# Patient Record
Sex: MALE | Race: WHITE | NOT HISPANIC OR LATINO | Employment: UNEMPLOYED | URBAN - METROPOLITAN AREA
[De-identification: names, ages, dates, MRNs, and addresses within clinical notes are randomized per-mention and may not be internally consistent; named-entity substitution may affect disease eponyms.]

---

## 2017-03-21 ENCOUNTER — HOSPITAL ENCOUNTER (EMERGENCY)
Facility: HOSPITAL | Age: 52
Discharge: HOME | End: 2017-03-21
Attending: EMERGENCY MEDICINE | Admitting: EMERGENCY MEDICINE
Payer: COMMERCIAL

## 2017-03-21 VITALS
DIASTOLIC BLOOD PRESSURE: 107 MMHG | OXYGEN SATURATION: 95 % | SYSTOLIC BLOOD PRESSURE: 154 MMHG | WEIGHT: 180 LBS | RESPIRATION RATE: 18 BRPM | TEMPERATURE: 97.8 F | HEART RATE: 83 BPM | BODY MASS INDEX: 28.93 KG/M2 | HEIGHT: 66 IN

## 2017-03-21 DIAGNOSIS — F25.0 SCHIZOAFFECTIVE DISORDER, BIPOLAR TYPE (HCC): Primary | Chronic | ICD-10-CM

## 2017-03-21 LAB — ETHANOL EXG-MCNC: 0 MG/DL

## 2017-03-21 PROCEDURE — 82075 ASSAY OF BREATH ETHANOL: CPT | Performed by: EMERGENCY MEDICINE

## 2017-03-21 PROCEDURE — 99284 EMERGENCY DEPT VISIT MOD MDM: CPT

## 2018-03-07 NOTE — PSYCH
History of Present Illness    Presenting Problems: Stressors: PATIENT WAS ADMITTED TO hospitals FOR ACUTE PSYCHOSIS DELUSIONAL THOUGHTS AUDITORY HALLUCINATIONS DUE TO FAMILY CONFLICT  Referral Source: [address] [place] [address]  He is not employed  He is not a smoker  Symptoms: no suicidal ideation, no self abusive behaviors, no homicidal thoughts, no history of violence toward others, depressed mood, anxiety, psychosis, no medication noncompliance, no sleep disturbances, no change in appetite, agitation, no hypomania and PANIC ATTACKS AND UNABLE TO CARE FOR SELF  Provisional Diagnosis: Axis I: SCHIZOAFFECTIVE DS BI POLAR TYPE  Substance Abuse: No substance abuse  Psychiatric Treatment History: hospitals 3 YEARS AGO, Current Psychiatrist: NONE and Therapist: NONE   The patient does not require ambulatory assistance  Legal Issues: The patient does not have legal issues  ACCEPTED  Appointment Date: 04/06/2016        Signatures   Electronically signed by : Amaya Martinez, ; Apr 5 2016  9:11AM EST                       (Author)

## 2022-03-22 ENCOUNTER — HOSPITAL ENCOUNTER (EMERGENCY)
Facility: HOSPITAL | Age: 57
Discharge: HOME | End: 2022-03-22
Attending: INTERNAL MEDICINE | Admitting: INTERNAL MEDICINE
Payer: COMMERCIAL

## 2022-03-22 VITALS
RESPIRATION RATE: 18 BRPM | HEIGHT: 66 IN | OXYGEN SATURATION: 97 % | TEMPERATURE: 98.5 F | HEART RATE: 96 BPM | WEIGHT: 170 LBS | SYSTOLIC BLOOD PRESSURE: 145 MMHG | DIASTOLIC BLOOD PRESSURE: 103 MMHG | BODY MASS INDEX: 27.32 KG/M2

## 2022-03-22 DIAGNOSIS — K04.7 DENTAL INFECTION: ICD-10-CM

## 2022-03-22 DIAGNOSIS — K02.9 DENTAL CARIES: Primary | ICD-10-CM

## 2022-03-22 PROCEDURE — 99284 EMERGENCY DEPT VISIT MOD MDM: CPT | Performed by: INTERNAL MEDICINE

## 2022-03-22 PROCEDURE — 99282 EMERGENCY DEPT VISIT SF MDM: CPT

## 2022-03-22 RX ORDER — IBUPROFEN 600 MG/1
600 TABLET ORAL ONCE
Status: COMPLETED | OUTPATIENT
Start: 2022-03-22 | End: 2022-03-22

## 2022-03-22 RX ORDER — CLINDAMYCIN HYDROCHLORIDE 150 MG/1
150 CAPSULE ORAL EVERY 6 HOURS
Qty: 28 CAPSULE | Refills: 0 | Status: SHIPPED | OUTPATIENT
Start: 2022-03-22 | End: 2022-03-29

## 2022-03-22 RX ORDER — CLINDAMYCIN HYDROCHLORIDE 150 MG/1
300 CAPSULE ORAL ONCE
Status: COMPLETED | OUTPATIENT
Start: 2022-03-22 | End: 2022-03-22

## 2022-03-22 RX ORDER — IBUPROFEN 600 MG/1
600 TABLET ORAL EVERY 6 HOURS PRN
Qty: 40 TABLET | Refills: 0 | Status: SHIPPED | OUTPATIENT
Start: 2022-03-22 | End: 2022-04-01

## 2022-03-22 RX ADMIN — IBUPROFEN 600 MG: 600 TABLET ORAL at 07:19

## 2022-03-22 RX ADMIN — CLINDAMYCIN HYDROCHLORIDE 300 MG: 150 CAPSULE ORAL at 07:19

## 2022-03-22 NOTE — ED PROVIDER NOTES
History  Chief Complaint   Patient presents with    Dental Pain     reports dental infection on left side of mouth x1 month, states swelling is now going up near his eye     This is 51y old came for having L upper gum pain for almost one month  Pt has no fever  Pt observed he has swollen L side of the face  Pt has no sob, HA, dizziness  Pt has no sinus problems  Pt has h/o of schizoaffective disorder and he is on zyprexa  Pt has other complaints  Prior to Admission Medications   Prescriptions Last Dose Informant Patient Reported? Taking? OLANZapine (ZyPREXA) 20 MG tablet 3/21/2022 at Unknown time  Yes Yes   Sig: Take 20 mg by mouth daily at bedtime   ibuprofen (MOTRIN) 600 mg tablet   No No   Sig: Take 1 tablet by mouth every 6 (six) hours as needed for mild pain for up to 10 days  ibuprofen (MOTRIN) 600 mg tablet   No No   Sig: Take 1 tablet (600 mg total) by mouth every 6 (six) hours as needed for mild pain for up to 10 days      Facility-Administered Medications: None       Past Medical History:   Diagnosis Date    Alcohol abuse 9/7/2016    Anxiety     [facility]ipolar disorder ([facility] )     Depression     Psychiatric disorder     Psychiatric illness     Psychotic disorder ([facility] ) 3/29/2016    Schizoaffective disorder ([place] [address] )        Past Surgical History:   Procedure Laterality Date    ORIF WRIST FRACTURE Left 9/8/2016    Procedure: OPEN REDUCTION W/ INTERNAL FIXATION (ORIF) RADIUS / ULNA (WRIST);   Surgeon: Zach Matthews MD;  Location: [facility] OR;  Service:        Family History   Problem Relation Age of Onset    No Known Problems Mother     No Known Problems Father     No Known Problems Sister     No Known Problems Brother     No Known Problems Maternal Aunt     No Known Problems Paternal Aunt     No Known Problems Maternal Uncle     No Known Problems Paternal Uncle     No Known Problems Maternal Grandfather     No Known Problems Maternal Grandmother     No Known Problems Paternal Grandfather  No Known Problems Paternal Grandmother     No Known Problems Cousin     ADD / ADHD Neg Hx     Alcohol abuse Neg Hx     Anxiety disorder Neg Hx     Bipolar disorder Neg Hx     Dementia Neg Hx     Depression Neg Hx     Drug abuse Neg Hx     OCD Neg Hx     Paranoid behavior Neg Hx     Schizophrenia Neg Hx     Seizures Neg Hx     Self-Injury Neg Hx     Suicide Attempts Neg Hx      I have reviewed and agree with the history as documented  E-Cigarette/Vaping     E-Cigarette/Vaping Substances     Social History     Tobacco Use    Smoking status: Former Smoker    Smokeless tobacco: Never Used   Substance Use Topics    Alcohol use: Yes     Alcohol/week: 5 0 standard drinks     Types: 5 Cans of beer per week     Comment: occasionally    Drug use: Yes     Types: Marijuana     Comment: pt states he has [facility]       Review of Systems   Constitutional: Negative for diaphoresis, fatigue and fever  HENT: Positive for facial swelling  Negative for congestion, dental problem, drooling, ear discharge, ear pain, hearing loss, mouth sores, nosebleeds, postnasal drip, rhinorrhea, sinus pressure, sinus pain, sneezing, sore throat, tinnitus and trouble swallowing  Eyes: Negative for photophobia, redness and visual disturbance  Respiratory: Negative for cough, chest tightness, shortness of breath and wheezing  Cardiovascular: Negative for chest pain, palpitations and leg swelling  Gastrointestinal: Negative for abdominal pain, diarrhea, nausea and vomiting  Genitourinary: Negative for difficulty urinating, dysuria, flank pain and hematuria  Musculoskeletal: Negative for arthralgias, back pain, gait problem, neck pain and neck stiffness  Skin: Negative for color change, pallor and rash  Allergic/Immunologic: Negative for immunocompromised state  Neurological: Negative for dizziness, light-headedness and headaches  Hematological: Negative for adenopathy   Does not bruise/bleed easily  Psychiatric/Behavioral: Negative for agitation and behavioral problems  Physical Exam  Physical Exam  Vitals and nursing note reviewed  Constitutional:       General: He is not in acute distress  Appearance: Normal appearance  He is well-developed and normal weight  He is not ill-appearing, toxic-appearing or diaphoretic  HENT:      Head: Normocephalic and atraumatic  Right Ear: Tympanic membrane, ear canal and external ear normal  There is no impacted cerumen  Left Ear: Tympanic membrane, ear canal and external ear normal  There is no impacted cerumen  Nose: Nose normal  No congestion or rhinorrhea  Mouth/Throat:      Mouth: Mucous membranes are moist       Pharynx: No oropharyngeal exudate or posterior oropharyngeal erythema  Comments: Examination of oral cavity; Has dental caries, has swelling of the upper gum at site of teeth #10,11,12  The gum is swollen reddish, there is missing tooth #10, has no sublingual or submandibular lymphadenopathy  Has L facial swelling and some edema but no tenderness no erythema  Eyes:      Extraocular Movements: Extraocular movements intact  Conjunctiva/sclera: Conjunctivae normal       Pupils: Pupils are equal, round, and reactive to light  Neck:      Vascular: No carotid bruit  Cardiovascular:      Rate and Rhythm: Normal rate and regular rhythm  Heart sounds: Normal heart sounds  No murmur heard  No friction rub  No gallop  Pulmonary:      Effort: Pulmonary effort is normal  No respiratory distress  Breath sounds: Normal breath sounds  No wheezing, rhonchi or rales  Chest:      Chest wall: No tenderness  Abdominal:      General: Bowel sounds are normal  There is no distension  Palpations: Abdomen is soft  There is no mass  Tenderness: There is no abdominal tenderness  There is no right CVA tenderness, left CVA tenderness or guarding     Musculoskeletal:         General: No swelling, tenderness, deformity or signs of injury  Normal range of motion  Cervical back: Normal range of motion and neck supple  No rigidity or tenderness  Right lower leg: No edema  Left lower leg: No edema  Lymphadenopathy:      Cervical: No cervical adenopathy  Skin:     General: Skin is warm and dry  Capillary Refill: Capillary refill takes less than 2 seconds  Coloration: Skin is not jaundiced or pale  Findings: No bruising, erythema, lesion or rash  Neurological:      General: No focal deficit present  Mental Status: He is alert and oriented to person, place, and time  Psychiatric:         Mood and Affect: Mood normal          Behavior: Behavior normal          Vital Signs  ED Triage Vitals [03/22/22 0648]   Temperature Pulse Respirations Blood Pressure SpO2   98 5 °F (36 9 °C) 96 18 (!) 145/103 97 %      Temp Source Heart Rate Source Patient Position - Orthostatic VS BP Location FiO2 (%)   Oral -- -- -- --      Pain Score       7           Vitals:    03/22/22 0648   BP: (!) 145/103   Pulse: 96         Visual Acuity      ED Medications  Medications   clindamycin (CLEOCIN) capsule 300 mg (300 mg Oral Given 3/22/22 0719)   ibuprofen (MOTRIN) tablet 600 mg (600 mg Oral Given 3/22/22 0719)       Diagnostic Studies  Results Reviewed     None                 No orders to display              Procedures  Procedures         ED Course                               SBIRT 20yo+      Most Recent Value   SBIRT (22 yo +)    In order to provide better care to our patients, we are screening all of our patients for alcohol and drug use  Would it be okay to ask you these screening questions? No Filed at: 03/22/2022 9669                    [facility]  Number of Diagnoses or Management Options  Diagnosis management comments: This is 51y old came for having swollen L upper gum for almost one month  Pt now has swollen L side of face  Pt has no fever, no other symptoms   Pt has no dentist and has extensive dental caries  Pt will start on clindamycin and ibuprofen and to follow up with dental clinic at \[facility]\""  Pt has facial swelling 2ry to dental caries and infection  Risk of Complications, Morbidity, and/or Mortality  Presenting problems: low  Management options: low        Disposition  Final diagnoses:   Dental infection   Dental caries     Time reflects when diagnosis was documented in both MDM as applicable and the Disposition within this note     Time User Action Codes Description Comment    3/22/2022  7:10 AM Doris Bedoya Add [K04 7] Dental infection     3/22/2022  7:10 AM AbdDoris juarez Add [K02 9] Dental caries     3/22/2022  7:12 AM AbdDoris juarez Modify [K04 7] Dental infection     3/22/2022  7:12 AM Doris Carpenter Modify [K02 9] Dental caries       ED Disposition     ED Disposition Condition Date/Time Comment    Discharge Stable Tue Mar 22, 2022  7:13 AM Kimber Joaquin discharge to home/self care  Follow-up Information     Follow up With Specialties Details Why [address] [facility] Surgery In 3 days  [address]            Discharge Medication List as of 3/22/2022  7:13 AM      START taking these medications    Details   clindamycin (CLEOCIN) 150 mg capsule Take 1 capsule (150 mg total) by mouth every 6 (six) hours for 7 days, Starting Tue 3/22/2022, Until Tue 3/29/2022, Normal         CONTINUE these medications which have CHANGED    Details   ibuprofen (MOTRIN) 600 mg tablet Take 1 tablet (600 mg total) by mouth every 6 (six) hours as needed for mild pain for up to 10 days, Starting Tue 3/22/2022, Until Fri 4/1/2022 at 2359, Print         CONTINUE these medications which have NOT CHANGED    Details   OLANZapine (ZyPREXA) 20 MG tablet Take 20 mg by mouth daily at bedtime, Until Discontinued, Historical Med             No discharge procedures on file      PDMP Review     None          ED Provider  Electronically Signed by           Ameena Leung MD  03/23/22 0989

## 2023-03-01 ENCOUNTER — HOSPITAL ENCOUNTER (EMERGENCY)
Facility: HOSPITAL | Age: 58
End: 2023-03-02
Attending: EMERGENCY MEDICINE

## 2023-03-01 DIAGNOSIS — L03.211 FACIAL CELLULITIS: ICD-10-CM

## 2023-03-01 DIAGNOSIS — K04.7 DENTAL ABSCESS: Primary | ICD-10-CM

## 2023-03-01 LAB
BASOPHILS # BLD AUTO: 0.05 THOUSANDS/ÂΜL (ref 0–0.1)
BASOPHILS NFR BLD AUTO: 1 % (ref 0–1)
EOSINOPHIL # BLD AUTO: 0 THOUSAND/ÂΜL (ref 0–0.61)
EOSINOPHIL NFR BLD AUTO: 0 % (ref 0–6)
ERYTHROCYTE [DISTWIDTH] IN BLOOD BY AUTOMATED COUNT: 13 % (ref 11.6–15.1)
HCT VFR BLD AUTO: 44.6 % (ref 36.5–49.3)
HGB BLD-MCNC: 15.3 G/DL (ref 12–17)
IMM GRANULOCYTES # BLD AUTO: 0.04 THOUSAND/UL (ref 0–0.2)
IMM GRANULOCYTES NFR BLD AUTO: 0 % (ref 0–2)
LYMPHOCYTES # BLD AUTO: 1.37 THOUSANDS/ÂΜL (ref 0.6–4.47)
LYMPHOCYTES NFR BLD AUTO: 13 % (ref 14–44)
MCH RBC QN AUTO: 28.6 PG (ref 26.8–34.3)
MCHC RBC AUTO-ENTMCNC: 34.3 G/DL (ref 31.4–37.4)
MCV RBC AUTO: 83 FL (ref 82–98)
MONOCYTES # BLD AUTO: 0.9 THOUSAND/ÂΜL (ref 0.17–1.22)
MONOCYTES NFR BLD AUTO: 8 % (ref 4–12)
NEUTROPHILS # BLD AUTO: 8.51 THOUSANDS/ÂΜL (ref 1.85–7.62)
NEUTS SEG NFR BLD AUTO: 78 % (ref 43–75)
NRBC BLD AUTO-RTO: 0 /100 WBCS
PLATELET # BLD AUTO: 253 THOUSANDS/UL (ref 149–390)
PMV BLD AUTO: 8.5 FL (ref 8.9–12.7)
RBC # BLD AUTO: 5.35 MILLION/UL (ref 3.88–5.62)
WBC # BLD AUTO: 10.87 THOUSAND/UL (ref 4.31–10.16)

## 2023-03-01 RX ADMIN — SODIUM CHLORIDE 1000 ML: 0.9 INJECTION, SOLUTION INTRAVENOUS at 23:59

## 2023-03-02 ENCOUNTER — APPOINTMENT (EMERGENCY)
Dept: CT IMAGING | Facility: HOSPITAL | Age: 58
End: 2023-03-02

## 2023-03-02 ENCOUNTER — HOSPITAL ENCOUNTER (INPATIENT)
Facility: HOSPITAL | Age: 58
LOS: 2 days | Discharge: HOME | End: 2023-03-04
Attending: INTERNAL MEDICINE | Admitting: INTERNAL MEDICINE

## 2023-03-02 VITALS
RESPIRATION RATE: 20 BRPM | BODY MASS INDEX: 27.32 KG/M2 | OXYGEN SATURATION: 98 % | WEIGHT: 170 LBS | HEART RATE: 89 BPM | HEIGHT: 66 IN | SYSTOLIC BLOOD PRESSURE: 166 MMHG | DIASTOLIC BLOOD PRESSURE: 97 MMHG | TEMPERATURE: 98.7 F

## 2023-03-02 DIAGNOSIS — F25.0 SCHIZOAFFECTIVE DISORDER, BIPOLAR TYPE (HCC): Chronic | ICD-10-CM

## 2023-03-02 DIAGNOSIS — K04.7 PERIAPICAL ABSCESS WITH FACIAL INVOLVEMENT: Primary | ICD-10-CM

## 2023-03-02 DIAGNOSIS — L03.211 FACIAL CELLULITIS: ICD-10-CM

## 2023-03-02 PROBLEM — E87.6 HYPOKALEMIA: Status: ACTIVE | Noted: 2023-03-02

## 2023-03-02 LAB
ALBUMIN SERPL BCP-MCNC: 4.3 G/DL (ref 3.5–5)
ALP SERPL-CCNC: 92 U/L (ref 34–104)
ALT SERPL W P-5'-P-CCNC: 25 U/L (ref 7–52)
ANION GAP SERPL CALCULATED.3IONS-SCNC: 15 MMOL/L (ref 4–13)
APTT PPP: 30 SECONDS (ref 23–37)
AST SERPL W P-5'-P-CCNC: 39 U/L (ref 13–39)
BILIRUB SERPL-MCNC: 0.87 MG/DL (ref 0.2–1)
BUN SERPL-MCNC: 10 MG/DL (ref 5–25)
CALCIUM SERPL-MCNC: 9.2 MG/DL (ref 8.4–10.2)
CHLORIDE SERPL-SCNC: 103 MMOL/L (ref 96–108)
CO2 SERPL-SCNC: 19 MMOL/L (ref 21–32)
CREAT SERPL-MCNC: 1.3 MG/DL (ref 0.6–1.3)
GFR SERPL CREATININE-BSD FRML MDRD: 60 ML/MIN/1.73SQ M
GLUCOSE SERPL-MCNC: 136 MG/DL (ref 65–140)
INR PPP: 0.97 (ref 0.84–1.19)
LACTATE SERPL-SCNC: 1.2 MMOL/L (ref 0.5–2)
POTASSIUM SERPL-SCNC: 3.2 MMOL/L (ref 3.5–5.3)
PROCALCITONIN SERPL-MCNC: 0.06 NG/ML
PROT SERPL-MCNC: 7.2 G/DL (ref 6.4–8.4)
PROTHROMBIN TIME: 13.2 SECONDS (ref 11.6–14.5)
SODIUM SERPL-SCNC: 137 MMOL/L (ref 135–147)

## 2023-03-02 RX ORDER — AMPICILLIN 1 G/1
INJECTION, POWDER, FOR SOLUTION INTRAMUSCULAR; INTRAVENOUS
Status: DISCONTINUED
Start: 2023-03-02 | End: 2023-03-02 | Stop reason: WASHOUT

## 2023-03-02 RX ORDER — POTASSIUM CHLORIDE 14.9 MG/ML
20 INJECTION INTRAVENOUS
Status: COMPLETED | OUTPATIENT
Start: 2023-03-02 | End: 2023-03-02

## 2023-03-02 RX ORDER — WATER 1000 ML/1000ML
INJECTION, SOLUTION INTRAVENOUS
Status: COMPLETED
Start: 2023-03-02 | End: 2023-03-02

## 2023-03-02 RX ORDER — ENOXAPARIN SODIUM 100 MG/ML
40 INJECTION SUBCUTANEOUS DAILY
Status: DISCONTINUED | OUTPATIENT
Start: 2023-03-03 | End: 2023-03-04 | Stop reason: HOSPADM

## 2023-03-02 RX ORDER — ONDANSETRON 2 MG/ML
4 INJECTION INTRAMUSCULAR; INTRAVENOUS EVERY 6 HOURS PRN
Status: DISCONTINUED | OUTPATIENT
Start: 2023-03-02 | End: 2023-03-04 | Stop reason: HOSPADM

## 2023-03-02 RX ORDER — OLANZAPINE 10 MG/1
20 TABLET ORAL
Status: DISCONTINUED | OUTPATIENT
Start: 2023-03-02 | End: 2023-03-04 | Stop reason: HOSPADM

## 2023-03-02 RX ORDER — ACETAMINOPHEN 325 MG/1
975 TABLET ORAL EVERY 8 HOURS SCHEDULED
Status: DISCONTINUED | OUTPATIENT
Start: 2023-03-02 | End: 2023-03-04 | Stop reason: HOSPADM

## 2023-03-02 RX ORDER — KETOROLAC TROMETHAMINE 30 MG/ML
15 INJECTION, SOLUTION INTRAMUSCULAR; INTRAVENOUS ONCE
Status: COMPLETED | OUTPATIENT
Start: 2023-03-02 | End: 2023-03-02

## 2023-03-02 RX ORDER — KETOROLAC TROMETHAMINE 30 MG/ML
30 INJECTION, SOLUTION INTRAMUSCULAR; INTRAVENOUS ONCE
Status: COMPLETED | OUTPATIENT
Start: 2023-03-02 | End: 2023-03-02

## 2023-03-02 RX ORDER — AMOXICILLIN 500 MG/1
500 CAPSULE ORAL EVERY 8 HOURS SCHEDULED
COMMUNITY
End: 2023-03-04

## 2023-03-02 RX ORDER — AMPICILLIN 2 G/1
INJECTION, POWDER, FOR SOLUTION INTRAVENOUS
Status: DISCONTINUED
Start: 2023-03-02 | End: 2023-03-02 | Stop reason: WASHOUT

## 2023-03-02 RX ORDER — AMPICILLIN AND SULBACTAM 2; 1 G/1; G/1
INJECTION, POWDER, FOR SOLUTION INTRAMUSCULAR; INTRAVENOUS
Status: DISCONTINUED
Start: 2023-03-02 | End: 2023-03-02 | Stop reason: HOSPADM

## 2023-03-02 RX ORDER — POTASSIUM CHLORIDE 20 MEQ/1
40 TABLET, EXTENDED RELEASE ORAL ONCE
Status: COMPLETED | OUTPATIENT
Start: 2023-03-02 | End: 2023-03-02

## 2023-03-02 RX ORDER — KETOROLAC TROMETHAMINE 30 MG/ML
15 INJECTION, SOLUTION INTRAMUSCULAR; INTRAVENOUS EVERY 6 HOURS PRN
Status: DISPENSED | OUTPATIENT
Start: 2023-03-02 | End: 2023-03-04

## 2023-03-02 RX ORDER — OXYCODONE HYDROCHLORIDE 5 MG/1
5 TABLET ORAL EVERY 6 HOURS PRN
Status: DISCONTINUED | OUTPATIENT
Start: 2023-03-02 | End: 2023-03-04 | Stop reason: HOSPADM

## 2023-03-02 RX ORDER — SODIUM CHLORIDE 9 MG/ML
75 INJECTION, SOLUTION INTRAVENOUS CONTINUOUS
Status: DISCONTINUED | OUTPATIENT
Start: 2023-03-02 | End: 2023-03-04 | Stop reason: HOSPADM

## 2023-03-02 RX ORDER — POTASSIUM CHLORIDE 29.8 MG/ML
40 INJECTION INTRAVENOUS ONCE
Status: DISCONTINUED | OUTPATIENT
Start: 2023-03-02 | End: 2023-03-02

## 2023-03-02 RX ORDER — SODIUM CHLORIDE, SODIUM GLUCONATE, SODIUM ACETATE, POTASSIUM CHLORIDE, MAGNESIUM CHLORIDE, SODIUM PHOSPHATE, DIBASIC, AND POTASSIUM PHOSPHATE .53; .5; .37; .037; .03; .012; .00082 G/100ML; G/100ML; G/100ML; G/100ML; G/100ML; G/100ML; G/100ML
100 INJECTION, SOLUTION INTRAVENOUS CONTINUOUS
Status: DISCONTINUED | OUTPATIENT
Start: 2023-03-02 | End: 2023-03-02 | Stop reason: HOSPADM

## 2023-03-02 RX ADMIN — IOHEXOL 80 ML: 350 INJECTION, SOLUTION INTRAVENOUS at 00:49

## 2023-03-02 RX ADMIN — SERTRALINE HYDROCHLORIDE 50 MG: 50 TABLET ORAL at 21:28

## 2023-03-02 RX ADMIN — SODIUM CHLORIDE, SODIUM GLUCONATE, SODIUM ACETATE, POTASSIUM CHLORIDE, MAGNESIUM CHLORIDE, SODIUM PHOSPHATE, DIBASIC, AND POTASSIUM PHOSPHATE 100 ML/HR: .53; .5; .37; .037; .03; .012; .00082 INJECTION, SOLUTION INTRAVENOUS at 03:44

## 2023-03-02 RX ADMIN — POTASSIUM CHLORIDE 20 MEQ: 14.9 INJECTION, SOLUTION INTRAVENOUS at 15:52

## 2023-03-02 RX ADMIN — AMPICILLIN AND SULBACTAM 1.5 G: 1; .5 INJECTION, POWDER, FOR SOLUTION INTRAMUSCULAR; INTRAVENOUS at 08:26

## 2023-03-02 RX ADMIN — ACETAMINOPHEN 975 MG: 325 TABLET ORAL at 15:06

## 2023-03-02 RX ADMIN — KETOROLAC TROMETHAMINE 30 MG: 30 INJECTION, SOLUTION INTRAMUSCULAR; INTRAVENOUS at 11:00

## 2023-03-02 RX ADMIN — WATER 10 ML: 1 INJECTION INTRAMUSCULAR; INTRAVENOUS; SUBCUTANEOUS at 02:50

## 2023-03-02 RX ADMIN — KETOROLAC TROMETHAMINE 15 MG: 30 INJECTION, SOLUTION INTRAMUSCULAR at 21:29

## 2023-03-02 RX ADMIN — SODIUM CHLORIDE 3 G: 9 INJECTION, SOLUTION INTRAVENOUS at 15:05

## 2023-03-02 RX ADMIN — SODIUM CHLORIDE 75 ML/HR: 0.9 INJECTION, SOLUTION INTRAVENOUS at 15:12

## 2023-03-02 RX ADMIN — OLANZAPINE 20 MG: 10 TABLET, FILM COATED ORAL at 21:28

## 2023-03-02 RX ADMIN — KETOROLAC TROMETHAMINE 15 MG: 30 INJECTION, SOLUTION INTRAMUSCULAR at 15:22

## 2023-03-02 RX ADMIN — SODIUM CHLORIDE 3 G: 9 INJECTION, SOLUTION INTRAVENOUS at 20:05

## 2023-03-02 RX ADMIN — POTASSIUM CHLORIDE 40 MEQ: 1500 TABLET, EXTENDED RELEASE ORAL at 01:00

## 2023-03-02 RX ADMIN — KETOROLAC TROMETHAMINE 15 MG: 30 INJECTION, SOLUTION INTRAMUSCULAR; INTRAVENOUS at 03:40

## 2023-03-02 RX ADMIN — SODIUM CHLORIDE 3 G: 9 INJECTION, SOLUTION INTRAVENOUS at 02:46

## 2023-03-02 RX ADMIN — ACETAMINOPHEN 975 MG: 325 TABLET ORAL at 21:28

## 2023-03-02 RX ADMIN — POTASSIUM CHLORIDE 20 MEQ: 14.9 INJECTION, SOLUTION INTRAVENOUS at 18:26

## 2023-03-02 NOTE — ASSESSMENT & PLAN NOTE
· Patient reports likely breaking a tooth on a granola bar a few days ago  Had facial swelling and pain and was seen at [facility] ER and the abscess was drained   Reports swelling came back worse shortly after so he went to [facility] and was transferred here  · CT scan with facial cellulitis and periapical abscess and submandibular abscess as well as signs of sialoadenitis  · Admit patient to med/surg under inpatient status   · OMFS consult   · Unasyn IV Q6  · Pain control   · Clear liquid   · NPO after midnight

## 2023-03-02 NOTE — ED CARE HANDOFF
Emergency Department Sign Out Note        Sign out and transfer of care from my [facility]  See Separate Emergency Department note  The patient, Kathy Arellano, was evaluated by the previous provider for facial abscess  Workup Completed:  Blood work, imaging    ED Course / Workup Pending (followup): Imaging revealed an abscess  OMFS was contacted which recommended transfer to [facility]  Patient has been given antibiotics  I spoke with medicine at [facility]  Patient is accepted  Pending an open bed      Labs Reviewed   CBC AND DIFFERENTIAL - Abnormal       Result Value Ref Range Status    WBC 10 87 (*) 4 31 - 10 16 Thousand/uL Final    RBC 5 35  3 88 - 5 62 Million/uL Final    Hemoglobin 15 3  12 0 - 17 0 g/dL Final    Hematocrit 44 6  36 5 - 49 3 % Final    MCV 83  82 - 98 fL Final    MCH 28 6  26 8 - 34 3 pg Final    MCHC 34 3  31 4 - 37 4 g/dL Final    RDW 13 0  11 6 - 15 1 % Final    MPV 8 5 (*) 8 9 - 12 7 fL Final    Platelets 223  504 - 390 Thousands/uL Final    nRBC 0  /100 WBCs Final    Neutrophils Relative 78 (*) 43 - 75 % Final    Immat GRANS % 0  0 - 2 % Final    Lymphocytes Relative 13 (*) 14 - 44 % Final    Monocytes Relative 8  4 - 12 % Final    Eosinophils Relative 0  0 - 6 % Final    Basophils Relative 1  0 - 1 % Final    Neutrophils Absolute 8 51 (*) 1 85 - 7 62 Thousands/µL Final    Immature Grans Absolute 0 04  0 00 - 0 20 Thousand/uL Final    Lymphocytes Absolute 1 37  0 60 - 4 47 Thousands/µL Final    Monocytes Absolute 0 90  0 17 - 1 22 Thousand/µL Final    Eosinophils Absolute 0 00  0 00 - 0 61 Thousand/µL Final    Basophils Absolute 0 05  0 00 - 0 10 Thousands/µL Final   COMPREHENSIVE METABOLIC PANEL - Abnormal    Sodium 137  135 - 147 mmol/L Final    Potassium 3 2 (*) 3 5 - 5 3 mmol/L Final    Chloride 103  96 - 108 mmol/L Final    CO2 19 (*) 21 - 32 mmol/L Final    ANION GAP 15 (*) 4 - 13 mmol/L Final    BUN 10  5 - 25 mg/dL Final    Creatinine 1 30  0 60 - 1 30 mg/dL Final Comment: Standardized to IDMS reference method    Glucose 136  65 - 140 mg/dL Final    Comment: If the patient is fasting, the ADA then defines impaired fasting glucose as > 100 mg/dL and diabetes as > or equal to 123 mg/dL  Specimen collection should occur prior to Sulfasalazine administration due to the potential for falsely depressed results  Specimen collection should occur prior to Sulfapyridine administration due to the potential for falsely elevated results  Calcium 9 2  8 4 - 10 2 mg/dL Final    AST 39  13 - 39 U/L Final    Comment: Specimen collection should occur prior to Sulfasalazine administration due to the potential for falsely depressed results  ALT 25  7 - 52 U/L Final    Comment: Specimen collection should occur prior to Sulfasalazine administration due to the potential for falsely depressed results  Alkaline Phosphatase 92  34 - 104 U/L Final    Total Protein 7 2  6 4 - 8 4 g/dL Final    Albumin 4 3  3 5 - 5 0 g/dL Final    Total Bilirubin 0 87  0 20 - 1 00 mg/dL Final    eGFR 60  ml/min/1 73sq m Final    Narrative:     [place] guidelines for Chronic Kidney Disease (CKD):   •  Stage 1 with normal or high GFR (GFR > 90 mL/min/1 73 square meters)  •  Stage 2 Mild CKD (GFR = 60-89 mL/min/1 73 square meters)  •  Stage 3A Moderate CKD (GFR = 45-59 mL/min/1 73 square meters)  •  Stage 3B Moderate CKD (GFR = 30-44 mL/min/1 73 square meters)  •  Stage 4 Severe CKD (GFR = 15-29 mL/min/1 73 square meters)  •  Stage 5 End Stage CKD (GFR <15 mL/min/1 73 square meters)  Note: GFR calculation is accurate only with a steady state creatinine   LACTIC ACID, PLASMA - Normal    LACTIC ACID 1 2  0 5 - 2 0 mmol/L Final    Narrative:     Result may be elevated if tourniquet was used during collection     PROCALCITONIN TEST - Normal    Procalcitonin 0 06  <=0 25 ng/ml Final    Comment: Suspected Lower Respiratory Tract Infection (LRTI):  - LESS than or EQUAL to 0 25 ng/mL:   low likelihood for bacterial LRTI; antibiotics DISCOURAGED   - GREATER than 0 25 ng/mL:   increased likelihood for bacterial LRTI; antibiotics ENCOURAGED  Suspected Sepsis:  - Strongly consider initiating antibiotics in ALL UNSTABLE patients  - LESS than or EQUAL to 0 5 ng/mL:   low likelihood for bacterial sepsis; antibiotics DISCOURAGED   - GREATER than 0 5 ng/mL:   increased likelihood for bacterial sepsis; antibiotics ENCOURAGED   - GREATER than 2 ng/mL:   high risk for severe sepsis / septic shock; antibiotics strongly ENCOURAGED  Decisions on antibiotic use should not be based solely on Procalcitonin (PCT) levels  If PCT is low but uncertainty exists with stopping antibiotics, repeat PCT in 6-24 hours to confirm the low level  If antibiotics are administered (regardless if initial PCT was high or low), repeat PCT every 1-2 days to consider early antibiotic cessation (when GREATER than 80% decrease from the peak OR when PCT drops below designated cutoffs, whichever comes first), so long as the infection is NOT one that typically requires prolonged treatment durations (e g , bone/joint infections, endocarditis, Staph  aureus bacteremia)      Situations of FALSE-POSITIVE Procalcitonin values:  1) Newborns < 67 hours old  2) Massive stress from severe trauma / burns, major surgery, acute pancreatitis, cardiogenic / hemorrhagic shock, sickle cell crisis, or other organ perfusion abnormalities  3) Malaria and some Candidal infections  4) Treatment with agents that stimulate cytokines (e g , OKT3, anti-lymphocyte globulins, alemtuzumab, IL-2, granulocyte transfusion [NOT GCSFs])  5) Chronic renal disease causes elevated baseline levels (consider GREATER than 0 75 ng/mL as an abnormal cut-off); initiating HD/CRRT may cause transient decreases  6) Paraneoplastic syndromes from medullary thyroid or SCLC, some forms of vasculitis, and acute [id number] disease    Situations of FALSE-NEGATIVE Procalcitonin values:  1) Too early in clinical course for PCT to have reached its peak (may repeat in 6-24 hours to confirm low level)  2) Localized infection WITHOUT systemic (SIRS / sepsis) response (e g , an abscess, osteomyelitis, cystitis)  3) Mycobacteria (e g , Tuberculosis, MAC)  4) Cystic fibrosis exacerbations     PROTIME-INR - Normal    Protime 13 2  11 6 - 14 5 seconds Final    INR 0 97  0 84 - 1 19 Final   APTT - Normal    PTT 30  23 - 37 seconds Final    Comment: Therapeutic Heparin Range =  60-90 seconds   BLOOD CULTURE   BLOOD CULTURE        CT facial bones with contrast    Result Date: 3/2/2023  Impression: 1  Extensive left facial cellulitis in the mandibular and submandibular region  2   Left mandibular subperiosteal abscess measuring 1 1 x 0 3 x 0 9 cm adjacent to the 1st molar  3   Left mandibular 1st molar periapical abscess with buccal cortical destruction suggesting odontogenic source of infection  4   Left submandibular sialoadenitis likely due to surrounding cellulitis  Suggestion of mild right submandibular sialoadenitis  Workstation performed: [id number]                                   ED Course as of 03/02/23 0619   u Mar 02, 2023   0202 Facial abscess  [facility] transfer  Tachycardic on arrival  Drainage performed at [facility] unsuccessfully  Procedures  Medical Decision Making  Dental abscess: acute illness or injury  Facial cellulitis: acute illness or injury  Amount and/or Complexity of Data Reviewed  Labs: ordered  Radiology: ordered  Risk  Prescription drug management                Disposition  Final diagnoses:   Dental abscess   Facial cellulitis     Time reflects when diagnosis was documented in both MDM as applicable and the Disposition within this note     Time User Action Codes Description Comment    3/2/2023  1:45 AM Frida Caulk Add [K04 7] Dental abscess     3/2/2023  1:46 AM Frida Caulk Add [D94 [id number]] Facial cellulitis       ED Disposition     ED Disposition   Transfer to Another Facility-In Network    Condition   --    Date/Time   Thu Mar 2, 2023  1:45 AM    Comment   Andrea Merlos should be transferred out to [facility]  Follow-up Information    None       Patient's Medications   Discharge Prescriptions    No medications on file     No discharge procedures on file         ED Provider  Electronically Signed by     Juan Alberto Larson DO  03/02/23 3718

## 2023-03-02 NOTE — H&P
[facility]  H&P- Freddy Matthews 1965, 66 y o  male MRN: [id number]  Unit/Bed#: W -01 Encounter: [id number]  Primary Care Provider: Coty Hazel DO   Date and time admitted to hospital: 3/2/2023  1:31 PM    * Periapical abscess with facial involvement  Assessment & Plan  · Patient reports likely breaking a tooth on a granola bar a few days ago  Had facial swelling and pain and was seen at [facility] ER and the abscess was drained  Reports swelling came back worse shortly after so he went [place] [facility] and was transferred here  · CT scan with facial cellulitis and periapical abscess and submandibular abscess as well as signs of sialoadenitis  · Admit patient to med/surg under inpatient status   · OMFS consult   · Unasyn IV Q6  · Pain control   · Clear liquid   · NPO after midnight      Schizoaffective disorder, bipolar type ([address] )  Assessment & Plan  · Appears stable at this time   · Continue Zyprexa and Zoloft     Hypokalemia  Assessment & Plan  · Noted at 3 2  · Give 40 mEq KCl IV   · BMP in AM       VTE Pharmacologic Prophylaxis: VTE Score: 3 Moderate Risk (Score 3-4) - Pharmacological DVT Prophylaxis Ordered: enoxaparin (Lovenox)  Code Status: Level 1 - Full Code   Discussion with family: None at bedside   Anticipated Length of Stay: Patient will be admitted on an inpatient basis with an anticipated length of stay of greater than 2 midnights secondary to as per above assessment and plan   Total Time Spent on Date of Encounter in care of patient: 75 minutes This time was spent on one or more of the following: performing physical exam; counseling and coordination of care; obtaining or reviewing history; documenting in the medical record; reviewing/ordering tests, medications or procedures; communicating with other healthcare professionals and discussing with patient's family/caregivers      Chief Complaint: Facial Swelling    History of Present Illness:  Freddy Matthews is a 66 y o  male with a PMH of schizoaffective disorder, bipolar type who presents with facial swelling  Reports that he may have cracked a tooth a few days ago on a hard granola bar  Reports increased pain and swelling thereafter  Was seen at [facility] ED and the area was drained and he was given an antibiotic, however his pain and swelling returned even worse from before so he re-presented to [facility]  Patient reports area is tender to palpation  He states that he was able to drink with this but eating is difficult  Reports prior gum swelling in the past, but nothing to this extent  States that he has had multiple fillings, but has not been to a dentist in some time  Denies fevers or chills  Denies difficulty swallowing, drooling, or shortness of breath  Review of Systems:  Review of Systems   Constitutional: Negative for appetite change, chills, diaphoresis, fatigue and fever  HENT: Positive for dental problem and facial swelling  Negative for congestion, rhinorrhea and sore throat  Eyes: Negative for visual disturbance  Respiratory: Negative for cough, chest tightness, shortness of breath and wheezing  Cardiovascular: Negative for chest pain, palpitations and leg swelling  Gastrointestinal: Negative for abdominal pain, constipation, diarrhea, nausea and vomiting  Genitourinary: Negative for dysuria  Musculoskeletal: Negative for arthralgias and myalgias  Neurological: Negative for dizziness, syncope, weakness, light-headedness, numbness and headaches  All other systems reviewed and are negative        Past Medical and Surgical History:   Past Medical History:   Diagnosis Date   • Alcohol abuse 9/7/2016   • Anxiety    • Bipolar disorder ([facility] )    • Depression    • Psychiatric disorder    • Psychiatric illness    • Psychotic disorder ([facility] [address] ) 3/29/2016   • Schizoaffective disorder [facility])        Past Surgical History:   Procedure Laterality Date   • ORIF WRIST FRACTURE Left 9/8/2016    Procedure: OPEN REDUCTION W/ INTERNAL FIXATION (ORIF) RADIUS / ULNA (WRIST); Surgeon: Tessy Talley MD;  Location: [facility] OR;  Service:        Meds/Allergies:  Prior to Admission medications    Medication Sig Start Date End Date Taking? Authorizing Provider   amoxicillin (AMOXIL) 500 mg capsule Take 500 mg by mouth every 8 (eight) hours    Historical Provider, MD   ibuprofen (MOTRIN) 600 mg tablet Take 1 tablet (600 mg total) by mouth every 6 (six) hours as needed for mild pain for up to 10 days 3/22/22 4/1/22  Angeles Blackman MD   OLANZapine (ZyPREXA) 20 MG tablet Take 20 mg by mouth daily at bedtime    Historical Provider, MD     I have reviewed home medications with patient personally  Allergies: Allergies   Allergen Reactions   • Haldol [Haloperidol]      Pt reports "I dont like it"   • Other      Triple antibiotic ointment         Social History:  Marital Status: Single   Occupation: Noncontributory   Patient Pre-hospital Living Situation: Home  Patient Pre-hospital Level of Mobility: walks  Patient Pre-hospital Diet Restrictions: None  Substance Use History:   Social History     Substance and Sexual Activity   Alcohol Use Yes   • Alcohol/week: 5 0 standard drinks   • Types: 5 Cans of beer per week    Comment: occasionally     Social History     Tobacco Use   Smoking Status Former   Smokeless Tobacco Never     Social History     Substance and Sexual Activity   Drug Use Yes   • Types: Marijuana    Comment: pt states he has a "certificate" from the govt       Family History:  Family History   Problem Relation Age of Onset   • No Known Problems Mother    • No Known Problems Father    • No Known Problems Sister    • No Known Problems Brother    • No Known Problems Maternal Aunt    • No Known Problems Paternal Aunt    • No Known Problems Maternal Uncle    • No Known Problems Paternal Uncle    • No Known Problems Maternal Grandfather    • No Known Problems Maternal Grandmother    • No Known Problems Paternal Grandfather    • No Known Problems Paternal Grandmother    • No Known Problems Cousin    • ADD / ADHD Neg Hx    • Alcohol abuse Neg Hx    • Anxiety disorder Neg Hx    • Bipolar disorder Neg Hx    • Dementia Neg Hx    • Depression Neg Hx    • Drug abuse Neg Hx    • OCD Neg Hx    • Paranoid behavior Neg Hx    • Schizophrenia Neg Hx    • Seizures Neg Hx    • Self-Injury Neg Hx    • Suicide Attempts Neg Hx        Physical Exam:     Vitals:   Blood Pressure: 128/82 (03/02/23 1351)  Pulse: 90 (03/02/23 1335)  Temperature: 98 3 °F (36 8 °C) (03/02/23 1335)  Respirations: 16 (03/02/23 1335)  SpO2: 96 % (03/02/23 1335)    Physical Exam  Constitutional:       General: He is not in acute distress  Appearance: Normal appearance  He is normal weight  He is not ill-appearing or diaphoretic  HENT:      Head: Normocephalic and atraumatic  Mouth/Throat:      Mouth: Mucous membranes are moist       Dentition: Abnormal dentition  Dental caries and dental abscesses present  Eyes:      General: No scleral icterus  Pupils: Pupils are equal, round, and reactive to light  Cardiovascular:      Rate and Rhythm: Normal rate and regular rhythm  Pulses: Normal pulses  Heart sounds: Normal heart sounds, S1 normal and S2 normal  No murmur heard  No systolic murmur is present  No diastolic murmur is present  No gallop  No S3 or S4 sounds  Pulmonary:      Effort: Pulmonary effort is normal  No accessory muscle usage or respiratory distress  Breath sounds: Normal breath sounds  No stridor  No decreased breath sounds, wheezing, rhonchi or rales  Chest:      Chest wall: No tenderness  Abdominal:      General: Bowel sounds are normal  There is no distension  Palpations: Abdomen is soft  Tenderness: There is no abdominal tenderness  There is no guarding  Musculoskeletal:      Right lower leg: No edema  Left lower leg: No edema  Skin:     General: Skin is warm and dry  Coloration: Skin is not jaundiced  Findings: Erythema present  Neurological:      General: No focal deficit present  Mental Status: He is alert  Mental status is at baseline  Motor: No tremor or seizure activity  Psychiatric:         Behavior: Behavior is cooperative  Additional Data:     Lab Results:  Results from last 7 days   Lab Units 03/01/23  2351   WBC Thousand/uL 10 87*   HEMOGLOBIN g/dL 15 3   HEMATOCRIT % 44 6   PLATELETS Thousands/uL 253   NEUTROS PCT % 78*   LYMPHS PCT % 13*   MONOS PCT % 8   EOS PCT % 0     Results from last 7 days   Lab Units 03/01/23  2351   SODIUM mmol/L 137   POTASSIUM mmol/L 3 2*   CHLORIDE mmol/L 103   CO2 mmol/L 19*   BUN mg/dL 10   CREATININE mg/dL 1 30   ANION GAP mmol/L 15*   CALCIUM mg/dL 9 2   ALBUMIN g/dL 4 3   TOTAL BILIRUBIN mg/dL 0 87   ALK PHOS U/L 92   ALT U/L 25   AST U/L 39   GLUCOSE RANDOM mg/dL 136     Results from last 7 days   Lab Units 03/01/23  2351   INR  0 97             Results from last 7 days   Lab Units 03/01/23  2351   LACTIC ACID mmol/L 1 2   PROCALCITONIN ng/ml 0 06       Lines/Drains:  Invasive Devices     Peripheral Intravenous Line  Duration           Peripheral IV 03/01/23 Distal;Right;Upper;Ventral (anterior) Arm <1 day                    Imaging: Reviewed radiology reports from this admission including: CT facial bone  No orders to display       EKG and Other Studies Reviewed on Admission:   · EKG: No EKG obtained  · CT facial bone: Extensive left facial cellulitis in the mandibular and submandibular region  Left mandibular subperiosteal abscess 1 1 x 0 3 x 0 9 cm adjacent to 1st molar  Left mandibular 1st molar periapical abscess with buccal cortical destruction suggesting odontogenic source of infection  Left submandibular sialoadenitis likely due to surround cellulitis  ** Please Note: This note has been constructed using a voice recognition system   **

## 2023-03-02 NOTE — ED NOTES
Pt reports pain and swelling increased to an 8 and would like medication  Provider notified       Paula Villagran RN  03/02/23 7807

## 2023-03-02 NOTE — ED NOTES
2 attempts made to give report, called [phone] no answer   Will make another attempt at a later time     Ole Scott RN  03/02/23 7420

## 2023-03-02 NOTE — PLAN OF CARE
Problem: PAIN - ADULT  Goal: Verbalizes/displays adequate comfort level or baseline comfort level  Description: Interventions:  - Encourage patient to monitor pain and request assistance  - Assess pain using appropriate pain scale  - Administer analgesics based on type and severity of pain and evaluate response  - Implement non-pharmacological measures as appropriate and evaluate response  - Consider cultural and social influences on pain and pain management  - Notify physician/advanced practitioner if interventions unsuccessful or patient reports new pain  Outcome: Progressing     Problem: INFECTION - ADULT  Goal: Absence or prevention of progression during hospitalization  Description: INTERVENTIONS:  - Assess and monitor for signs and symptoms of infection  - Monitor lab/diagnostic results  - Monitor all insertion sites, i e  indwelling lines, tubes, and drains  - Monitor endotracheal if appropriate and nasal secretions for changes in amount and color  - [place] appropriate cooling/warming therapies per order  - Administer medications as ordered  - Instruct and encourage patient and family to use good hand hygiene technique  - Identify and instruct in appropriate isolation precautions for identified infection/condition  Outcome: Progressing  Goal: Absence of fever/infection during neutropenic period  Description: INTERVENTIONS:  - Monitor WBC    Outcome: Progressing     Problem: SAFETY ADULT  Goal: Patient will remain free of falls  Description: INTERVENTIONS:  - Educate patient/family on patient safety including physical limitations  - Instruct patient to call for assistance with activity   - Consult OT/PT to assist with strengthening/mobility   - Keep Call bell within reach  - Keep bed low and locked with side rails adjusted as appropriate  - Keep care items and personal belongings within reach  - Initiate and maintain comfort rounds  - Make Fall Risk Sign visible to staff  - Offer Toileting every  Hours, in advance of need  - Initiate/Maintain alarm  - Obtain necessary fall risk management equipment:   - Apply yellow socks and bracelet for high fall risk patients  - Consider moving patient to room near nurses station  Outcome: Progressing  Goal: Maintain or return to baseline ADL function  Description: INTERVENTIONS:  -  Assess patient's ability to carry out ADLs; assess patient's baseline for ADL function and identify physical deficits which impact ability to perform ADLs (bathing, care of mouth/teeth, toileting, grooming, dressing, etc )  - Assess/evaluate cause of self-care deficits   - Assess range of motion  - Assess patient's mobility; develop plan if impaired  - Assess patient's need for assistive devices and provide as appropriate  - Encourage maximum independence but intervene and supervise when necessary  - Involve family in performance of ADLs  - Assess for home care needs following discharge   - Consider OT consult to assist with ADL evaluation and planning for discharge  - Provide patient education as appropriate  Outcome: Progressing  Goal: Maintains/Returns to pre admission functional level  Description: INTERVENTIONS:  - Perform BMAT or MOVE assessment daily    - Set and communicate daily mobility goal to care team and patient/family/caregiver  - Collaborate with rehabilitation services on mobility goals if consulted  - Perform Range of Motion  times a day  - Reposition patient every  hours    - Dangle patient  times a day  - Stand patient  times a day  - Ambulate patient  times a day  - Out of bed to chair  times a day   - Out of bed for meals times a day  - Out of bed for toileting  - Record patient progress and toleration of activity level   Outcome: Progressing     Problem: DISCHARGE PLANNING  Goal: Discharge to home or other facility with appropriate resources  Description: INTERVENTIONS:  - Identify barriers to discharge w/patient and caregiver  - Arrange for needed discharge resources and transportation as appropriate  - Identify discharge learning needs (meds, wound care, etc )  - Arrange for interpretive services to assist at discharge as needed  - Refer to Case Management Department for coordinating discharge planning if the patient needs post-hospital services based on physician/advanced practitioner order or complex needs related to functional status, cognitive ability, or social support system  Outcome: Progressing     Problem: Knowledge Deficit  Goal: Patient/family/caregiver demonstrates understanding of disease process, treatment plan, medications, and discharge instructions  Description: Complete learning assessment and assess knowledge base    Interventions:  - Provide teaching at level of understanding  - Provide teaching via preferred learning methods  Outcome: Progressing

## 2023-03-02 NOTE — ED NOTES
Respirations easy and unlabored in no acute distress  No audible stridor noted       Luke Cesar RN  03/02/23 8370

## 2023-03-02 NOTE — EMTALA/ACUTE CARE TRANSFER
Formerly [facility], [facility] EMERGENCY DEPARTMENT  [address] [address]  Dept: [phone]      EMTALA TRANSFER CONSENT    NAME Heidi Smyth                                         1965                              MRN [id number]    I have been informed of my rights regarding examination, treatment, and transfer   by Dr Yulia Sinha DO    Benefits: Specialized equipment and/or services available at the receiving facility (Include comment)________________________ (OMFS)    Risks: Potential for delay in receiving treatment, Potential deterioration of medical condition, Loss of IV, Increased discomfort during transfer, Possible worsening of condition or death during transfer      Consent for Transfer:  I acknowledge that my medical condition has been evaluated and explained to me by the emergency department physician or other qualified medical person and/or my attending physician, who has recommended that I be transferred to the service of  Accepting Physician: Wilber Ward at [address] [place] [address] Name, [address] : [address]  The above potential benefits of such transfer, the potential risks associated with such transfer, and the probable risks of not being transferred have been explained to me, and I fully understand them  The doctor has explained that, in my case, the benefits of transfer outweigh the risks  I agree to be transferred  I authorize the performance of emergency medical procedures and treatments upon me in both transit and upon arrival at the receiving facility  Additionally, I authorize the release of any and all medical records to the receiving facility and request they be transported with me, if possible  I understand that the safest mode of transportation during a medical emergency is an ambulance and that the Hospital advocates the use of this mode of transport   Risks of traveling to the receiving facility by car, including absence of medical control, life sustaining equipment, such as oxygen, and medical personnel has been explained to me and I fully understand them  (JAZMIN CORRECT BOX BELOW)  [  ]  I consent to the stated transfer and to be transported by ambulance/helicopter  [  ]  I consent to the stated transfer, but refuse transportation by ambulance and accept full responsibility for my transportation by car  I understand the risks of non-ambulance transfers and I exonerate the Hospital and its staff from any deterioration in my condition that results from this refusal     X___________________________________________    DATE  23  TIME________  Signature of patient or legally responsible individual signing on patient behalf           RELATIONSHIP TO PATIENT_________________________          Provider Certification    NAME Kavon Montgomery                                         1965                              MRN [id number]    A medical screening exam was performed on the above named patient  Based on the examination:    Condition Necessitating Transfer The primary encounter diagnosis was Dental abscess  A diagnosis of Facial cellulitis was also pertinent to this visit      Patient Condition: The patient has been stabilized such that within reasonable medical probability, no material deterioration of the patient condition or the condition of the unborn child(keenan) is likely to result from the transfer    Reason for Transfer: Level of Care needed not available at this facility (OMFS)    Transfer Requirements: [address]   · Space available and qualified personnel available for treatment as acknowledged by    · Agreed to accept transfer and to provide appropriate medical treatment as acknowledged by       [facility]  · Appropriate medical records of the examination and treatment of the patient are provided at the time of transfer   [address] _______  · Transfer will be performed by qualified personnel from    and appropriate transfer equipment as required, including the use of necessary and appropriate life support measures  Provider Certification: I have examined the patient and explained the following risks and benefits of being transferred/refusing transfer to the patient/family:  General risk, such as traffic hazards, adverse weather conditions, rough terrain or turbulence, possible failure of equipment (including vehicle or aircraft), or consequences of actions of persons outside the control of the transport personnel, Risk of worsening condition, Unanticipated needs of medical equipment and personnel during transport, The possibility of a transport vehicle being unavailable      Based on these reasonable risks and benefits to the patient and/or the unborn child(keenan), and based upon the information available at the time of the patient’s examination, I certify that the medical benefits reasonably to be expected from the provision of appropriate medical treatments at another medical facility outweigh the increasing risks, if any, to the individual’s medical condition, and in the case of labor to the unborn child, from effecting the transfer      X____________________________________________ DATE 03/02/23        TIME_______      ORIGINAL - SEND TO MEDICAL RECORDS   COPY - SEND WITH PATIENT DURING TRANSFER

## 2023-03-02 NOTE — APP STUDENT NOTE
KASEY STUDENT  Inpatient H&P Exam for TRAINING ONLY  Not Part of Legal Medical Record       H&P Exam - Rashmi Clark 66 y o  male MRN: [id number]  Unit/Bed#: W -01 Encounter: [id number]      No new Assessment & Plan notes have been filed under this hospital service since the last note was generated  Service: Internal Medicine    Facial swelling  - swelling x 2 weeks due to likely cracked molar on lower left side  - patient does not follow with dentist  - OMFS consult  - Patient started on PO amoxicillin at [address] on 3/01  - Start IV Unasyn today  - Clear liquid diet today, NPO at midnight  - Manage pain with PO Tylenol    Schizoaffective disorder, bipolar type  - Continue home regimen of Zoloft & Zyprexa    Elevated blood pressure  - patient given medication for HTN at behavioral health hospitalization in 2018   - patient did not  script from pharmacy following hospitalization in 2018  Does not take any medications for BP at home  - monitor patient BP  - does not appear to need medication for elevated blood pressure at this time      VTE Prophylaxis: low risk - promote ambulation  / reason for no mechanical VTE prophylaxis low risk -- promote ambulation   Code Status: Patient full code  POLST: unconfirmed  Discussion with family: patient questions answered at bedside    Anticipated Length of Stay:  Patient will be admitted on an Inpatient basis with an anticipated length of stay of  2 midnights  Justification for Hospital Stay: treatment of facial swelling    Total Time for Visit, including Counseling / Coordination of Care: 45 minutes  Greater than 50% of this total time spent on direct patient counseling and coordination of care  Chief Complaint:   Facial swelling    History of Present Illness:    Rashmi Clark is a 66 y o  male PMH HTN, alcohol abuse, schizoaffective disorder bipolar type, who presents with facial swelling x 2 weeks   He presented to the 2018 [facility] ED on 3/02, and was transfered to the [address] [place] [address] due to [facility] care availability  He reports the onset of the abscess occurred after he ate a hard granola bar and felt like he chipped one of his teeth on the lower left side of his mouth approximately 2 weeks ago  He went to [place] [address] ED on 3/02, where he had an I&D of the abscess done, without success  He was discharged home from [facility] ED on 3/02  Later on 3/02, he visited the [address] [place] [address] due to increased facial swelling  He was provided PO amoxicillin, which he took twice on 3/01  He has not taken any amoxicillin on 3/02 yet  He reports an 8/10 dull pain localized to the left side of the face and down his left side of his neck  He reports the pain is worse with palpation  He has not been given any pain medications  He has only had swelling of his gums in the past  His last drink was 2 weeks ago, and he reports drinking on occasion  He reports he took a potassium pill this morning with water  He has not had anything to eat or drink otherwise since arriving at the hospital  He denies difficulty swallowing his saliva, and pain with swallowing  Review of Systems:    Review of Systems   Constitutional: Negative for appetite change, chills, fatigue and fever  HENT: Positive for dental problem (poor dentition ) and facial swelling  Negative for congestion, drooling, ear pain, hearing loss, rhinorrhea, sore throat and trouble swallowing  Eyes: Negative for pain, discharge, redness and visual disturbance  Respiratory: Negative for cough, chest tightness, shortness of breath and stridor  Cardiovascular: Negative for chest pain and palpitations  Musculoskeletal: Negative for arthralgias and myalgias  Skin: Positive for rash (he has patchy dermatitis of his forehead at baseline  )  Neurological: Negative for numbness and headaches  Psychiatric/Behavioral: Negative for hallucinations and suicidal ideas         Past Medical and Surgical History:     Past Medical History:   Diagnosis Date   • Alcohol abuse 9/7/2016   • Anxiety    • [facility]    • Depression    • Psychiatric disorder    • Psychiatric illness    • Psychotic disorder ([facility] ) 3/29/2016   • Schizoaffective disorder [facility])        Past Surgical History:   Procedure Laterality Date   • ORIF WRIST FRACTURE Left 9/8/2016    Procedure: OPEN REDUCTION W/ INTERNAL FIXATION (ORIF) RADIUS / ULNA (WRIST); Surgeon: Erika Hernandez MD;  Location: [facility] MAIN OR;  Service:        Meds/Allergies:    Prior to Admission medications    Medication Sig Start Date End Date Taking? Authorizing Provider   amoxicillin (AMOXIL) 500 mg capsule Take 500 mg by mouth every 8 (eight) hours    Historical Provider, MD   ibuprofen (MOTRIN) 600 mg tablet Take 1 tablet (600 mg total) by mouth every 6 (six) hours as needed for mild pain for up to 10 days 3/22/22 4/1/22  Claudine Cornejo MD   OLANZapine (ZyPREXA) 20 MG tablet Take 20 mg by mouth daily at bedtime    Historical Provider, MD     I have reviewed home medications with patient personally  Allergies: Allergies   Allergen Reactions   • Haldol [Haloperidol]      Pt reports "I dont like it"   • Other      Triple antibiotic ointment         Social History:     Marital Status: Single   Occupation: unknown   Patient Pre-hospital Living Situation: home  Patient Pre-hospital Level of Mobility: ambulatory  Patient Pre-hospital Diet Restrictions: none  Substance Use History:   Social History     Substance and Sexual Activity   Alcohol Use Yes   • Alcohol/week: 5 0 standard drinks   • Types: 5 Cans of beer per week    Comment: occasionally     Social History     Tobacco Use   Smoking Status Former   Smokeless Tobacco Never     Social History     Substance and Sexual Activity   Drug Use Yes   • Types: Marijuana    Comment: pt states he has a "certificate" from the [address] [place] [place] [address] Drive History:    non-contributory    Physical Exam:     Vitals:   Blood Pressure: 148/98 (03/02/23 1335)  Pulse: 90 (03/02/23 1335)  Respirations: 16 (03/02/23 1335)  SpO2: 96 % (03/02/23 1335)    Physical Exam  Constitutional:       General: He is awake  He is not in acute distress  Appearance: He is ill-appearing  He is not toxic-appearing  HENT:      Head: Normocephalic and atraumatic  No right periorbital erythema or left periorbital erythema  Jaw: Tenderness (left mandibular area tender to palpation) present  No trismus  Comments: Notable swelling of left mandibular area with continuation down the left neck  Swelling does not reach left eye or left side of nose  Erythema of the anterior neck, which blanches with palpation  Nose: No congestion or rhinorrhea  Mouth/Throat:      Mouth: Mucous membranes are moist       Pharynx: Oropharynx is clear  Posterior oropharyngeal erythema present  Eyes:      General:         Right eye: No discharge  Left eye: No discharge  Extraocular Movements: Extraocular movements intact  Cardiovascular:      Rate and Rhythm: Normal rate and regular rhythm  Pulses: Normal pulses  Heart sounds: No murmur heard  No friction rub  No gallop  Pulmonary:      Effort: Pulmonary effort is normal  No respiratory distress  Breath sounds: Normal breath sounds  No wheezing, rhonchi or rales  Abdominal:      Palpations: Abdomen is soft  Tenderness: There is no abdominal tenderness  There is no guarding or rebound  Musculoskeletal:      Cervical back: Normal range of motion  No rigidity  Skin:     General: Skin is warm  Neurological:      General: No focal deficit present  Mental Status: He is alert and oriented to person, place, and time  Mental status is at baseline  Psychiatric:         Attention and Perception: He does not perceive auditory or visual hallucinations  Behavior: Behavior is cooperative  Thought Content: Thought content does not include suicidal ideation  Additional Data:     Lab Results: I have personally reviewed pertinent films in PACS with a Radiologist     Results from last 7 days   Lab Units 03/01/23  2351   WBC Thousand/uL 10 87*   HEMOGLOBIN g/dL 15 3   HEMATOCRIT % 44 6   PLATELETS Thousands/uL 253   NEUTROS PCT % 78*   LYMPHS PCT % 13*   MONOS PCT % 8   EOS PCT % 0     Results from last 7 days   Lab Units 03/01/23  2351   SODIUM mmol/L 137   POTASSIUM mmol/L 3 2*   CHLORIDE mmol/L 103   CO2 mmol/L 19*   BUN mg/dL 10   CREATININE mg/dL 1 30   ANION GAP mmol/L 15*   CALCIUM mg/dL 9 2   ALBUMIN g/dL 4 3   TOTAL BILIRUBIN mg/dL 0 87   ALK PHOS U/L 92   ALT U/L 25   AST U/L 39   GLUCOSE RANDOM mg/dL 136     Results from last 7 days   Lab Units 03/01/23  2351   INR  0 97             Results from last 7 days   Lab Units 03/01/23  2351   LACTIC ACID mmol/L 1 2   PROCALCITONIN ng/ml 0 06       Imaging: I have personally reviewed pertinent films in PACS with a Radiologist     No orders to display       EKG, Pathology, and Other Studies Reviewed on Admission:   · EKG:    Allscripts / Epic Records Reviewed: Yes     ** Please Note: This note has been constructed using a voice recognition system   **

## 2023-03-02 NOTE — CONSULTS
Patient Name: Don Shannon YOB: 1965    Medical Record No : [id number]     Admit/Registration Date: 3/2/2023  1:31 PM  Date of Consult: 03/02/23      Oral and Maxillofacial Surgery Consult Note    Assessment:  66 y o  male with left facial cellulitis and left buccal space abscess a/w tooth #19    Plan/Recs:  - OR tomorrow for incision and drainage with Dr Ambreen Higuera   - NPO at [facility]   - warm compress to face prn   - pain control per primary team     -----------------------------------------    Chief Complaint:  Pain and swelling pf the left face  And     HPI:  66 y o  male  with PMH of schizoaffective disorder, bipolar type who presents with left facial swelling  Reports that he cracked a tooth a few weeks ago that recent became pain full and swollen  Pt reports previous treatment at [facility] for I&D but the swelling has since recurred and has gotten worse  PT reports pain 3/10 (improved with IV Unasyn, and pain medication)  Pt denies SOB, CP, dyspnea, odynophagia, F/C/N/V  VSS  Pre-admission records reviewed  PMH/PSH/Meds/Allergies Reviewed  Past Medical History:   Diagnosis Date   • Alcohol abuse 9/7/2016   • Anxiety    • Bipolar disorder ([facility] )    • Depression    • Psychiatric disorder    • Psychiatric illness    • Psychotic disorder ([facility] ) 3/29/2016   • Schizoaffective disorder [facility])      Past Surgical History:   Procedure Laterality Date   • ORIF WRIST FRACTURE Left 9/8/2016    Procedure: OPEN REDUCTION W/ INTERNAL FIXATION (ORIF) RADIUS / ULNA (WRIST); Surgeon: Kevin Martinez MD;  Location: [facility] MA[facility];  Service:        Allergies   Allergen Reactions   • Haldol [Haloperidol]      Pt reports "I dont like it"   • Other      Triple antibiotic ointment         Social History     Socioeconomic History   • Marital status: Single     Spouse name: Not on file   • Number of children: Not on file   • Years of education: Not on file   • Highest education level: Not on file   Occupational History   • Not on file   Tobacco Use   • Smoking status: Former   • Smokeless tobacco: Never   Substance and Sexual Activity   • Alcohol use:  Yes     Alcohol/week: 5 0 standard drinks     Types: 5 Cans of beer per week     Comment: occasionally   • Drug use: Yes     Types: Marijuana     Comment: pt states he has a "certificate" from the govt   • Sexual activity: Not on file   Other Topics Concern   • Not on file   Social History Narrative   • Not on file     Social Determinants of Health     Financial Resource Strain: Not on file   Food Insecurity: Not on file   Transportation Needs: Not on file   Physical Activity: Not on file   Stress: Not on file   Social Connections: Not on file   Intimate Partner Violence: Not on file   Housing Stability: Not on file       Scheduled Medications  Current Facility-Administered Medications   Medication Dose Route Frequency Provider Last Rate   • acetaminophen  975 mg Oral [facility] Heraclio Mcdonnell PA-C     • ampicillin-sulbactam  3 g Intravenous Q6H Everlene Imelda, PA-C 3 g (03/02/23 1505)   • [START ON 3/3/2023] enoxaparin  40 mg Subcutaneous Daily Heraclio Mcdonnell PA-C     • ketorolac  15 mg Intravenous Q6H PRN Everlene ImeldaBRUNO     • OLANZapine  20 mg Oral HS Heraclio Mcdonnell PA-C     • ondansetron  4 mg Intravenous Q6H PRN Everlene ImeldaBRUNO     • oxyCODONE  5 mg Oral Q6H PRN Everlene Glenn, PA-ETHEL     • potassium chloride  20 mEq Intravenous Q2H Everlene Imelda PA-C 20 mEq (03/02/23 1552)   • sertraline  50 mg Oral Daily Heraclio Mcdonnell PA-C     • sodium chloride  75 mL/hr Intravenous Continuous Everlene Imelda, PA-C 75 mL/hr (03/02/23 1512)       PRN Medications  •  ketorolac  •  ondansetron  •  oxyCODONE    Medication Infusions  sodium chloride, 75 mL/hr, Last Rate: 75 mL/hr (03/02/23 1512)        Review of Systems    Vitals:    Temp:  [98 3 °F (36 8 °C)-99 7 °F (37 6 °C)] 99 7 °F (37 6 °C)  HR:  [] 87  Resp:  [16-21] 21  BP: (121-186)/() 155/98  Wt Readings from Last 1 Encounters:   03/02/23 77 1 kg (170 lb)     Ht Readings from Last 1 Encounters:   03/02/23 5' 6" (1 676 m)     There is no height or weight on file to calculate BMI  Respiratory    Lab Data (Last 4 hours)    None         O2/Vent Data (Last 4 hours)    None              Patient Lines/Drains/Airways Status     Active Airway     None              I/O:  Current Diet Order:        Diet Orders   (From admission, onward)             Start     Ordered    03/02/23 1427  Diet NPO; Sips with meds  Diet effective now        References:    Nutrtion Support Algorithm Enteral vs  Parenteral   Question Answer Comment   Diet Type NPO    NPO Except: Sips with meds    RD to adjust diet per protocol? Yes        03/02/23 1426               No intake or output data in the 24 hours ending 03/02/23 1706    Labs:  Results from last 7 days   Lab Units 03/01/23  2351   WBC Thousand/uL 10 87*   HEMOGLOBIN g/dL 15 3   HEMATOCRIT % 44 6   PLATELETS Thousands/uL 253     Results from last 7 days   Lab Units 03/01/23  2351   POTASSIUM mmol/L 3 2*   CHLORIDE mmol/L 103   CO2 mmol/L 19*   BUN mg/dL 10   CREATININE mg/dL 1 30   CALCIUM mg/dL 9 2     Results from last 7 days   Lab Units 03/01/23  2351   INR  0 97   PTT seconds 30         Pain Management Panel    There is no flowsheet data to display  Physical Exam:   General: Integment: skin warm and dry, patient is WD/WN, Voice quality: normal, in NAD  Neuro Exam: AAO x 3, CN V,VII grossly intact  Head: Normocephalic, no scalp lacerations or hematoma   Face: mild left mid and lower facial swelling  Swelling is soft, tender and moderately warm to touch   No lacerations/Abrasions/Step Offs    Ears: Pinna wnl bilaterally, no otorrhea, hearing grossly intact  Eyes/Periorbital: Pupils equal, round, reactive to light and Extraocular movements intact, intercanthal distance wnl   Nose: External nose symmetrical/no gross deformity, no nasal crepitus, no nasal septal hematoma, no rhinorrhea, no epistaxis, bilateral nares patent,    Oral Exam:left buccal mucosal swelling and tenderness with vestibular, floor of mouth is soft with no palpable masses, tongue protrusion is midline and has full range of motion, no pharyngeal edema or exudate   Dentalalveolar Exam: fully dentate with poor oral hygiene, heavily restored dentition, #19 +TTP, root tips #3 and #14     No other grossly carious or fracture teeth,  Normal dentition, atraumatic and occlusion stable, FERDINAND 30mm + guarding lateral excursive movements wnl   Lymph/Neck Exam: Neck is soft, trachea is midline, Left submandibular lymphadenopathy,         José Petite, DMD

## 2023-03-02 NOTE — ED PROVIDER NOTES
History  Chief Complaint   Patient presents with   • Abscess     Pt states he was seen today at [facility] earlier today for an abscess in his mouth and they drained it  Pt states it swelled immensely afterwards  Pt took tylenol PTA     37-year-old male presents to the emergency department for evaluation facial swelling  The patient reports that he ate a granola bar and believes that may have chipped a tooth approximately 1 week ago  States that over the past couple of days he has had worsening left-sided facial swelling  He went to a different hospital earlier today and states that he was diagnosed with a facial abscess and was started on antibiotics  Per chart review and incision and drainage was attempted but no purulent drainage was noted  The patient was started on amoxicillin and discharged with a recommendation to follow-up with his dentist   Patient states that the swelling became worse so he came here for further evaluation  He took a dose of Tylenol for his pain prior to arrival   He denies fevers, chills, nausea, vomiting, diarrhea, shortness of breath and tongue or throat swelling  Prior to Admission Medications   Prescriptions Last Dose Informant Patient Reported? Taking? OLANZapine (ZyPREXA) 20 MG tablet 3/2/2023  Yes Yes   Sig: Take 20 mg by mouth daily at bedtime      Facility-Administered Medications: None       Past Medical History:   Diagnosis Date   • Alcohol abuse 9/7/2016   • Anxiety    • Bipolar disorder ([facility] )    • Depression    • Psychiatric disorder    • Psychiatric illness    • Psychotic disorder ([facility] ) 3/29/2016   • Schizoaffective disorder [facility])        Past Surgical History:   Procedure Laterality Date   • ORIF WRIST FRACTURE Left 9/8/2016    Procedure: OPEN REDUCTION W/ INTERNAL FIXATION (ORIF) RADIUS / ULNA (WRIST);   Surgeon: Analy Mcmanus MD;  Location:  MAIN OR;  Service:        Family History   Problem Relation Age of Onset   • No Known Problems Mother    • No Known Problems Father    • No Known Problems Sister    • No Known Problems Brother    • No Known Problems Maternal Aunt    • No Known Problems Paternal Aunt    • No Known Problems Maternal Uncle    • No Known Problems Paternal Uncle    • No Known Problems Maternal Grandfather    • No Known Problems Maternal Grandmother    • No Known Problems Paternal Grandfather    • No Known Problems Paternal Grandmother    • No Known Problems Cousin    • ADD / ADHD Neg Hx    • Alcohol abuse Neg Hx    • Anxiety disorder Neg Hx    • Bipolar disorder Neg Hx    • Dementia Neg Hx    • Depression Neg Hx    • Drug abuse Neg Hx    • OCD Neg Hx    • Paranoid behavior Neg Hx    • Schizophrenia Neg Hx    • Seizures Neg Hx    • Self-Injury Neg Hx    • Suicide Attempts Neg Hx      I have reviewed and agree with the history as documented  E-Cigarette/Vaping     E-Cigarette/Vaping Substances     Social History     Tobacco Use   • Smoking status: Former   • Smokeless tobacco: Never   Substance Use Topics   • Alcohol use: Yes     Alcohol/week: 5 0 standard drinks     Types: 5 Cans of beer per week     Comment: occasionally   • Drug use: Yes     Types: Marijuana     Comment: pt states he has a "certificate" from the govt       Review of Systems   Constitutional: Negative for chills and fever  HENT: Positive for dental problem and facial swelling  Negative for ear pain and sore throat  Eyes: Negative for pain and visual disturbance  Respiratory: Negative for cough and shortness of breath  Cardiovascular: Negative for chest pain and palpitations  Gastrointestinal: Negative for abdominal pain and vomiting  Genitourinary: Negative for dysuria and hematuria  Musculoskeletal: Negative for arthralgias and back pain  Skin: Negative for color change and rash  Neurological: Negative for seizures and syncope  All other systems reviewed and are negative  Physical Exam  Physical Exam  Vitals and nursing note reviewed  Constitutional:       General: He is not in acute distress  Appearance: He is well-developed  HENT:      Head: Normocephalic and atraumatic  Mouth/Throat:      Dentition: Abnormal dentition  Dental caries and dental abscesses present  Pharynx: Oropharynx is clear  Uvula midline  Eyes:      Conjunctiva/sclera: Conjunctivae normal    Cardiovascular:      Rate and Rhythm: Normal rate and regular rhythm  Heart sounds: No murmur heard  Pulmonary:      Effort: Pulmonary effort is normal  No respiratory distress  Breath sounds: Normal breath sounds  Abdominal:      Palpations: Abdomen is soft  Tenderness: There is no abdominal tenderness  Musculoskeletal:         General: No swelling  Cervical back: Neck supple  Skin:     General: Skin is warm and dry  Capillary Refill: Capillary refill takes less than 2 seconds  Neurological:      Mental Status: He is alert     Psychiatric:         Mood and Affect: Mood normal          Vital Signs  ED Triage Vitals   Temperature Pulse Respirations Blood Pressure SpO2   03/01/23 2314 03/01/23 2314 03/01/23 2314 03/01/23 2314 03/01/23 2314   99 5 °F (37 5 °C) (!) 125 16 (!) 186/100 96 %      Temp Source Heart Rate Source Patient Position - Orthostatic VS BP Location FiO2 (%)   03/01/23 2314 03/02/23 0027 03/01/23 2314 03/01/23 2314 --   Oral Monitor Lying Right arm       Pain Score       03/01/23 2314       10 - Worst Possible Pain           Vitals:    03/02/23 0335 03/02/23 0456 03/02/23 0722 03/02/23 1048   BP: (!) 158/105 131/97 121/83 166/97   Pulse: 88 99 82 89   Patient Position - Orthostatic VS: Lying Lying Lying          Visual Acuity      ED Medications  Medications   sodium chloride 0 9 % bolus 1,000 mL (0 mL Intravenous Stopped 3/2/23 0059)   potassium chloride (K-DUR,KLOR-CON) CR tablet 40 mEq (40 mEq Oral Given 3/2/23 0100)   iohexol (OMNIPAQUE) 350 MG/ML injection (SINGLE-DOSE) 100 mL (80 mL Intravenous Given 3/2/23 0049) ampicillin-sulbactam (UNASYN) 3 g in sodium chloride 0 9 % 100 mL IVPB (0 g Intravenous Stopped 3/2/23 0316)   sterile water injection **ADS Override Pull** (10 mL  Given 3/2/23 0250)   ketorolac (TORADOL) injection 15 mg (15 mg Intravenous Given 3/2/23 0340)   ketorolac (TORADOL) injection 30 mg (30 mg Intravenous Given 3/2/23 1100)       Diagnostic Studies  Results Reviewed     Procedure Component Value Units Date/Time    Blood culture #1 [[id number]] Collected: 03/01/23 2351    Lab Status: Preliminary result Specimen: Blood from Arm, Right Updated: 03/05/23 1001     Blood Culture No Growth at 72 hrs  Blood culture #2 [[id number]] Collected: 03/01/23 2351    Lab Status: Preliminary result Specimen: Blood from Hand, Right Updated: 03/05/23 1001     Blood Culture No Growth at 72 hrs      Procalcitonin [[id number]]  (Normal) Collected: 03/01/23 2351    Lab Status: Final result Specimen: Blood from Arm, Right Updated: 03/02/23 0033     Procalcitonin 0 06 ng/ml     Comprehensive metabolic panel [[id number]]  (Abnormal) Collected: 03/01/23 2351    Lab Status: Final result Specimen: Blood from Arm, Right Updated: 03/02/23 0024     Sodium 137 mmol/L      Potassium 3 2 mmol/L      Chloride 103 mmol/L      CO2 19 mmol/L      ANION GAP 15 mmol/L      BUN 10 mg/dL      Creatinine 1 30 mg/dL      Glucose 136 mg/dL      Calcium 9 2 mg/dL      AST 39 U/L      ALT 25 U/L      Alkaline Phosphatase 92 U/L      Total Protein 7 2 g/dL      Albumin 4 3 g/dL      Total Bilirubin 0 87 mg/dL      eGFR 60 ml/min/1 73sq m     Narrative:      [place] guidelines for Chronic Kidney Disease (CKD):   •  Stage 1 with normal or high GFR (GFR > 90 mL/min/1 73 square meters)  •  Stage 2 Mild CKD (GFR = 60-89 mL/min/1 73 square meters)  •  Stage 3A Moderate CKD (GFR = 45-59 mL/min/1 73 square meters)  •  Stage 3B Moderate CKD (GFR = 30-44 mL/min/1 73 square meters)  •  Stage 4 Severe CKD (GFR = 15-29 mL/min/1 73 square meters)  •  Stage 5 End Stage CKD (GFR <15 mL/min/1 73 square meters)  Note: GFR calculation is accurate only with a steady state creatinine    Lactic acid [[id number]]  (Normal) Collected: 03/01/23 2351    Lab Status: Final result Specimen: Blood from Arm, Right Updated: 03/02/23 0024     LACTIC ACID 1 2 mmol/L     Narrative:      Result may be elevated if tourniquet was used during collection  Protime-INR [[id number]]  (Normal) Collected: 03/01/23 2351    Lab Status: Final result Specimen: Blood from Arm, Right Updated: 03/02/23 0022     Protime 13 2 seconds      INR 0 97    APTT [[id number]]  (Normal) Collected: 03/01/23 2351    Lab Status: Final result Specimen: Blood from Arm, Right Updated: 03/02/23 0022     PTT 30 seconds     CBC and differential [[id number]]  (Abnormal) Collected: 03/01/23 2351    Lab Status: Final result Specimen: Blood from Arm, Right Updated: 03/01/23 2359     WBC 10 87 Thousand/uL      RBC 5 35 Million/uL      Hemoglobin 15 3 g/dL      Hematocrit 44 6 %      MCV 83 fL      MCH 28 6 pg      MCHC 34 3 g/dL      RDW 13 0 %      MPV 8 5 fL      Platelets 773 Thousands/uL      nRBC 0 /100 WBCs      Neutrophils Relative 78 %      Immat GRANS % 0 %      Lymphocytes Relative 13 %      Monocytes Relative 8 %      Eosinophils Relative 0 %      Basophils Relative 1 %      Neutrophils Absolute 8 51 Thousands/µL      Immature Grans Absolute 0 04 Thousand/uL      Lymphocytes Absolute 1 37 Thousands/µL      Monocytes Absolute 0 90 Thousand/µL      Eosinophils Absolute 0 00 Thousand/µL      Basophils Absolute 0 05 Thousands/µL                  CT facial bones with contrast   Final Result by Vishnu Cook MD (03/02 0126)         1  Extensive left facial cellulitis in the mandibular and submandibular region  2   Left mandibular subperiosteal abscess measuring 1 1 x 0 3 x 0 9 cm adjacent to the 1st molar     3   Left mandibular 1st molar periapical abscess with buccal cortical destruction suggesting odontogenic source of infection  4   Left submandibular sialoadenitis likely due to surrounding cellulitis  Suggestion of mild right submandibular sialoadenitis  Workstation performed: [id number]                    Procedures  Procedures         ED Course  ED Course as of 03/05/23 1110   Thu Mar 02, 2023   0152 Discussed with on-call OMS, Amairani Fraser, who recommended admitting the patient to [facility] and transferring to [facility]  Initial Sepsis Screening     Row Name 03/02/23 0028                Is the patient's history suggestive of a new or worsening infection? Yes (Proceed)  -KF        Suspected source of infection soft tissue  -KF        Indicate SIRS criteria Tachycardia > 90 bpm  -KF        Are two or more of the above signs & symptoms of infection both present and new to the patient? No  -KF              User Key  (r) = Recorded By, (t) = Taken By, (c) = Cosigned By    [address] Name Provider Type    Patt Serrano MD Physician                SBIRT 22yo+    [address] Most Recent Value   SBIRT (25 yo +)    In order to provide better care to our patients, we are screening all of our patients for alcohol and drug use  Would it be okay to ask you these screening questions? No Filed at: 03/02/2023 8615                    Medical Decision Making  61-year-old male presented to the emergency department for evaluation of facial swelling  On arrival patient was noted to have significant swelling to the left side of his face  Patient was in no acute respiratory distress  He was able to speak in complete sentences  Initial vital signs showed that the patient was tachycardic but otherwise vital signs were within normal limits  Work-up done in the emergency department showed the patient had a mild leukocytosis  Procalcitonin and lactic acid were within normal limits  Imaging showed "1    Extensive left facial cellulitis in the mandibular and submandibular region  2   Left mandibular subperiosteal abscess measuring 1 1 x 0 3 x 0 9 cm adjacent to the 1st molar  3   Left mandibular 1st molar periapical abscess with buccal cortical destruction suggesting odontogenic source of infection  4   Left submandibular sialoadenitis likely due to surrounding cellulitis  Suggestion of mild right submandibular sialoadenitis "  Results were discussed with the on-call OMFS who recommended transferring the patient to [facility]  Patient was given a dose of Unasyn  The patient was signed out to a different provider prior to the patient officially being accepted for transfer  The patient was updated on all results and the recommendation from OMFS to be transferred  Dental abscess: acute illness or injury  Facial cellulitis: acute illness or injury  Amount and/or Complexity of Data Reviewed  External Data Reviewed: notes  Details: External notes from [facility] were reviewed  Labs: ordered  Radiology: ordered  Discussion of management or test interpretation with external provider(s): The case was discussed with the on-call OMFS physician who recommended transferring the patient to [facility] for further treatment and evaluation  Risk  Prescription drug management  Decision regarding hospitalization  Disposition  Final diagnoses:   Dental abscess   Facial cellulitis     Time reflects when diagnosis was documented in both MDM as applicable and the Disposition within this note     Time User Action Codes Description Comment    3/2/2023  1:45 AM Mal Caceres Add [K04 7] Dental abscess     3/2/2023  1:46 AM Mal Caceres Add [A20 940] Facial cellulitis       ED Disposition     ED Disposition   Transfer to Another Facility-In Network    Condition   --    Date/Time   Thu Mar 2, 2023  1:45 AM    Comment   Kavon Montgomery should be transferred out to [facility]             MD Documentation    Flowsheet Row Most Recent Value   Patient Condition The patient has been stabilized such that within reasonable medical probability, no material deterioration of the patient condition or the condition of the unborn child(keenan) is likely to result from the transfer   Reason for Transfer Level of Care needed not available at this facility  [OMFS]   Benefits of Transfer Specialized equipment and/or services available at the receiving facility (Include comment)________________________  [OMFS]   Risks of Transfer Potential for delay in receiving treatment, Potential deterioration of medical condition, Loss of IV, Increased discomfort during transfer, Possible worsening of condition or death during transfer   [address] Name, [facility]   Sending MD [facility]   Provider Certification General risk, such as traffic hazards, adverse weather conditions, rough terrain or turbulence, possible failure of equipment (including vehicle or aircraft), or consequences of actions of persons outside the control of the transport personnel, Risk of worsening condition, Unanticipated needs of medical equipment and personnel during transport, The possibility of a transport vehicle being unavailable      RN Documentation    Flowsheet Row Most [facility] Name, [address] [place] [address]      Follow-up Information    None         Discharge Medication List as of 3/2/2023 12:53 PM      CONTINUE these medications which have NOT CHANGED    Details   OLANZapine (ZyPREXA) 20 MG tablet Take 20 mg by mouth daily at bedtime, Until Discontinued, Historical Med      amoxicillin (AMOXIL) 500 mg capsule Take 500 mg by mouth every 8 (eight) hours, Historical Med      ibuprofen (MOTRIN) 600 mg tablet Take 1 tablet (600 mg total) by mouth every 6 (six) hours as needed for mild pain for up to 10 days, Starting Tue 3/22/2022, Until Fri 4/1/2022 at 2359, Print             No discharge procedures on file      PDMP Review     None          ED Provider  Electronically Signed by           Larry Villavicencio MD  03/05/23 7141

## 2023-03-03 ENCOUNTER — ANESTHESIA (INPATIENT)
Dept: PERIOP | Facility: HOSPITAL | Age: 58
End: 2023-03-03

## 2023-03-03 ENCOUNTER — ANESTHESIA EVENT (INPATIENT)
Dept: PERIOP | Facility: HOSPITAL | Age: 58
End: 2023-03-03

## 2023-03-03 PROBLEM — E87.6 HYPOKALEMIA: Status: RESOLVED | Noted: 2023-03-02 | Resolved: 2023-03-03

## 2023-03-03 PROBLEM — D72.829 LEUKOCYTOSIS: Status: ACTIVE | Noted: 2023-03-03

## 2023-03-03 PROBLEM — L03.211 FACIAL CELLULITIS: Status: ACTIVE | Noted: 2023-03-02

## 2023-03-03 LAB
ANION GAP SERPL CALCULATED.3IONS-SCNC: 7 MMOL/L (ref 4–13)
ATRIAL RATE: 105 BPM
BUN SERPL-MCNC: 9 MG/DL (ref 5–25)
CALCIUM SERPL-MCNC: 8.8 MG/DL (ref 8.4–10.2)
CHLORIDE SERPL-SCNC: 109 MMOL/L (ref 96–108)
CO2 SERPL-SCNC: 23 MMOL/L (ref 21–32)
CREAT SERPL-MCNC: 1.27 MG/DL (ref 0.6–1.3)
ERYTHROCYTE [DISTWIDTH] IN BLOOD BY AUTOMATED COUNT: 12.7 % (ref 11.6–15.1)
GFR SERPL CREATININE-BSD FRML MDRD: 62 ML/MIN/1.73SQ M
GLUCOSE SERPL-MCNC: 108 MG/DL (ref 65–140)
HCT VFR BLD AUTO: 46.3 % (ref 36.5–49.3)
HGB BLD-MCNC: 15.1 G/DL (ref 12–17)
MCH RBC QN AUTO: 28.8 PG (ref 26.8–34.3)
MCHC RBC AUTO-ENTMCNC: 32.6 G/DL (ref 31.4–37.4)
MCV RBC AUTO: 88 FL (ref 82–98)
P AXIS: 34 DEGREES
PLATELET # BLD AUTO: 221 THOUSANDS/UL (ref 149–390)
PMV BLD AUTO: 8.5 FL (ref 8.9–12.7)
POTASSIUM SERPL-SCNC: 4.3 MMOL/L (ref 3.5–5.3)
PR INTERVAL: 140 MS
QRS AXIS: 37 DEGREES
QRSD INTERVAL: 76 MS
QT INTERVAL: 308 MS
QTC INTERVAL: 407 MS
RBC # BLD AUTO: 5.25 MILLION/UL (ref 3.88–5.62)
SODIUM SERPL-SCNC: 139 MMOL/L (ref 135–147)
T WAVE AXIS: -14 DEGREES
VENTRICULAR RATE: 105 BPM
WBC # BLD AUTO: 12.24 THOUSAND/UL (ref 4.31–10.16)

## 2023-03-03 PROCEDURE — 0C94XZZ DRAINAGE OF BUCCAL MUCOSA, EXTERNAL APPROACH: ICD-10-PCS | Performed by: DENTIST

## 2023-03-03 PROCEDURE — 0CDXXZ0 EXTRACTION OF LOWER TOOTH, SINGLE, EXTERNAL APPROACH: ICD-10-PCS | Performed by: DENTIST

## 2023-03-03 RX ORDER — SUCCINYLCHOLINE/SOD CL,ISO/PF 100 MG/5ML
SYRINGE (ML) INTRAVENOUS AS NEEDED
Status: DISCONTINUED | OUTPATIENT
Start: 2023-03-03 | End: 2023-03-03

## 2023-03-03 RX ORDER — MAGNESIUM HYDROXIDE 1200 MG/15ML
LIQUID ORAL AS NEEDED
Status: DISCONTINUED | OUTPATIENT
Start: 2023-03-03 | End: 2023-03-03 | Stop reason: HOSPADM

## 2023-03-03 RX ORDER — HYDROMORPHONE HCL IN WATER/PF 6 MG/30 ML
0.2 PATIENT CONTROLLED ANALGESIA SYRINGE INTRAVENOUS
Status: DISCONTINUED | OUTPATIENT
Start: 2023-03-03 | End: 2023-03-04 | Stop reason: HOSPADM

## 2023-03-03 RX ORDER — DEXAMETHASONE SODIUM PHOSPHATE 10 MG/ML
INJECTION, SOLUTION INTRAMUSCULAR; INTRAVENOUS AS NEEDED
Status: DISCONTINUED | OUTPATIENT
Start: 2023-03-03 | End: 2023-03-03

## 2023-03-03 RX ORDER — ONDANSETRON 2 MG/ML
INJECTION INTRAMUSCULAR; INTRAVENOUS AS NEEDED
Status: DISCONTINUED | OUTPATIENT
Start: 2023-03-03 | End: 2023-03-03

## 2023-03-03 RX ORDER — LABETALOL HYDROCHLORIDE 5 MG/ML
5 INJECTION, SOLUTION INTRAVENOUS
Status: DISCONTINUED | OUTPATIENT
Start: 2023-03-03 | End: 2023-03-03 | Stop reason: HOSPADM

## 2023-03-03 RX ORDER — HYDROMORPHONE HCL IN WATER/PF 6 MG/30 ML
0.2 PATIENT CONTROLLED ANALGESIA SYRINGE INTRAVENOUS EVERY 4 HOURS PRN
Status: DISCONTINUED | OUTPATIENT
Start: 2023-03-03 | End: 2023-03-03

## 2023-03-03 RX ORDER — MIDAZOLAM HYDROCHLORIDE 2 MG/2ML
INJECTION, SOLUTION INTRAMUSCULAR; INTRAVENOUS AS NEEDED
Status: DISCONTINUED | OUTPATIENT
Start: 2023-03-03 | End: 2023-03-03

## 2023-03-03 RX ORDER — ONDANSETRON 2 MG/ML
4 INJECTION INTRAMUSCULAR; INTRAVENOUS ONCE AS NEEDED
Status: DISCONTINUED | OUTPATIENT
Start: 2023-03-03 | End: 2023-03-03 | Stop reason: HOSPADM

## 2023-03-03 RX ORDER — LIDOCAINE HYDROCHLORIDE AND EPINEPHRINE 10; 10 MG/ML; UG/ML
INJECTION, SOLUTION INFILTRATION; PERINEURAL AS NEEDED
Status: DISCONTINUED | OUTPATIENT
Start: 2023-03-03 | End: 2023-03-03 | Stop reason: HOSPADM

## 2023-03-03 RX ORDER — LIDOCAINE HYDROCHLORIDE 10 MG/ML
INJECTION, SOLUTION EPIDURAL; INFILTRATION; INTRACAUDAL; PERINEURAL AS NEEDED
Status: DISCONTINUED | OUTPATIENT
Start: 2023-03-03 | End: 2023-03-03

## 2023-03-03 RX ORDER — FENTANYL CITRATE 50 UG/ML
INJECTION, SOLUTION INTRAMUSCULAR; INTRAVENOUS AS NEEDED
Status: DISCONTINUED | OUTPATIENT
Start: 2023-03-03 | End: 2023-03-03

## 2023-03-03 RX ORDER — SODIUM CHLORIDE, SODIUM LACTATE, POTASSIUM CHLORIDE, CALCIUM CHLORIDE 600; 310; 30; 20 MG/100ML; MG/100ML; MG/100ML; MG/100ML
125 INJECTION, SOLUTION INTRAVENOUS CONTINUOUS
Status: CANCELLED | OUTPATIENT
Start: 2023-03-03

## 2023-03-03 RX ORDER — SODIUM CHLORIDE, SODIUM LACTATE, POTASSIUM CHLORIDE, CALCIUM CHLORIDE 600; 310; 30; 20 MG/100ML; MG/100ML; MG/100ML; MG/100ML
INJECTION, SOLUTION INTRAVENOUS CONTINUOUS PRN
Status: DISCONTINUED | OUTPATIENT
Start: 2023-03-03 | End: 2023-03-03

## 2023-03-03 RX ORDER — CHLORHEXIDINE GLUCONATE 0.12 MG/ML
15 RINSE ORAL EVERY 12 HOURS SCHEDULED
Status: DISCONTINUED | OUTPATIENT
Start: 2023-03-03 | End: 2023-03-04 | Stop reason: HOSPADM

## 2023-03-03 RX ORDER — ROCURONIUM BROMIDE 10 MG/ML
INJECTION, SOLUTION INTRAVENOUS AS NEEDED
Status: DISCONTINUED | OUTPATIENT
Start: 2023-03-03 | End: 2023-03-03

## 2023-03-03 RX ORDER — SODIUM CHLORIDE 9 MG/ML
INJECTION, SOLUTION INTRAVENOUS CONTINUOUS PRN
Status: DISCONTINUED | OUTPATIENT
Start: 2023-03-03 | End: 2023-03-03

## 2023-03-03 RX ORDER — HYDROMORPHONE HCL/PF 1 MG/ML
0.5 SYRINGE (ML) INJECTION
Status: DISCONTINUED | OUTPATIENT
Start: 2023-03-03 | End: 2023-03-03 | Stop reason: HOSPADM

## 2023-03-03 RX ORDER — PROPOFOL 10 MG/ML
INJECTION, EMULSION INTRAVENOUS AS NEEDED
Status: DISCONTINUED | OUTPATIENT
Start: 2023-03-03 | End: 2023-03-03

## 2023-03-03 RX ORDER — MEPERIDINE HYDROCHLORIDE 25 MG/ML
12.5 INJECTION INTRAMUSCULAR; INTRAVENOUS; SUBCUTANEOUS ONCE
Status: DISCONTINUED | OUTPATIENT
Start: 2023-03-03 | End: 2023-03-03 | Stop reason: HOSPADM

## 2023-03-03 RX ORDER — PROMETHAZINE HYDROCHLORIDE 25 MG/ML
12.5 INJECTION, SOLUTION INTRAMUSCULAR; INTRAVENOUS ONCE AS NEEDED
Status: DISCONTINUED | OUTPATIENT
Start: 2023-03-03 | End: 2023-03-03 | Stop reason: HOSPADM

## 2023-03-03 RX ORDER — FENTANYL CITRATE/PF 50 MCG/ML
25 SYRINGE (ML) INJECTION
Status: DISCONTINUED | OUTPATIENT
Start: 2023-03-03 | End: 2023-03-03 | Stop reason: HOSPADM

## 2023-03-03 RX ORDER — METOPROLOL TARTRATE 5 MG/5ML
INJECTION INTRAVENOUS AS NEEDED
Status: DISCONTINUED | OUTPATIENT
Start: 2023-03-03 | End: 2023-03-03

## 2023-03-03 RX ORDER — HYDROMORPHONE HCL/PF 1 MG/ML
0.5 SYRINGE (ML) INJECTION
Status: DISCONTINUED | OUTPATIENT
Start: 2023-03-03 | End: 2023-03-03

## 2023-03-03 RX ORDER — ALBUTEROL SULFATE 2.5 MG/3ML
2.5 SOLUTION RESPIRATORY (INHALATION) ONCE AS NEEDED
Status: DISCONTINUED | OUTPATIENT
Start: 2023-03-03 | End: 2023-03-03 | Stop reason: HOSPADM

## 2023-03-03 RX ADMIN — SODIUM CHLORIDE 3 G: 9 INJECTION, SOLUTION INTRAVENOUS at 01:37

## 2023-03-03 RX ADMIN — SODIUM CHLORIDE 3 G: 9 INJECTION, SOLUTION INTRAVENOUS at 14:30

## 2023-03-03 RX ADMIN — SODIUM CHLORIDE 3 G: 9 INJECTION, SOLUTION INTRAVENOUS at 21:44

## 2023-03-03 RX ADMIN — DEXAMETHASONE SODIUM PHOSPHATE 5 MG: 10 INJECTION, SOLUTION INTRAMUSCULAR; INTRAVENOUS at 16:30

## 2023-03-03 RX ADMIN — ROCURONIUM BROMIDE 20 MG: 10 SOLUTION INTRAVENOUS at 16:18

## 2023-03-03 RX ADMIN — DEXMEDETOMIDINE HYDROCHLORIDE 0.2 MCG/KG/HR: 100 INJECTION, SOLUTION INTRAVENOUS at 16:13

## 2023-03-03 RX ADMIN — ACETAMINOPHEN 975 MG: 325 TABLET ORAL at 14:59

## 2023-03-03 RX ADMIN — SUGAMMADEX 200 MG: 100 INJECTION, SOLUTION INTRAVENOUS at 16:45

## 2023-03-03 RX ADMIN — METOROPROLOL TARTRATE 3 MG: 5 INJECTION, SOLUTION INTRAVENOUS at 16:25

## 2023-03-03 RX ADMIN — Medication 100 MG: at 16:09

## 2023-03-03 RX ADMIN — FENTANYL CITRATE 50 MCG: 50 INJECTION, SOLUTION INTRAMUSCULAR; INTRAVENOUS at 16:09

## 2023-03-03 RX ADMIN — LIDOCAINE HYDROCHLORIDE 50 MG: 10 INJECTION, SOLUTION EPIDURAL; INFILTRATION; INTRACAUDAL at 16:09

## 2023-03-03 RX ADMIN — MIDAZOLAM HYDROCHLORIDE 2 MG: 1 INJECTION, SOLUTION INTRAMUSCULAR; INTRAVENOUS at 16:06

## 2023-03-03 RX ADMIN — FENTANYL CITRATE 50 MCG: 50 INJECTION, SOLUTION INTRAMUSCULAR; INTRAVENOUS at 16:29

## 2023-03-03 RX ADMIN — ACETAMINOPHEN 975 MG: 325 TABLET ORAL at 06:11

## 2023-03-03 RX ADMIN — KETOROLAC TROMETHAMINE 15 MG: 30 INJECTION, SOLUTION INTRAMUSCULAR at 20:08

## 2023-03-03 RX ADMIN — FENTANYL CITRATE 50 MCG: 50 INJECTION, SOLUTION INTRAMUSCULAR; INTRAVENOUS at 16:18

## 2023-03-03 RX ADMIN — PROPOFOL 200 MG: 10 INJECTION, EMULSION INTRAVENOUS at 16:09

## 2023-03-03 RX ADMIN — SODIUM CHLORIDE: 0.9 INJECTION, SOLUTION INTRAVENOUS at 16:07

## 2023-03-03 RX ADMIN — SODIUM CHLORIDE 75 ML/HR: 0.9 INJECTION, SOLUTION INTRAVENOUS at 06:45

## 2023-03-03 RX ADMIN — SODIUM CHLORIDE 3 G: 9 INJECTION, SOLUTION INTRAVENOUS at 09:25

## 2023-03-03 RX ADMIN — SODIUM CHLORIDE, SODIUM LACTATE, POTASSIUM CHLORIDE, AND CALCIUM CHLORIDE: .6; .31; .03; .02 INJECTION, SOLUTION INTRAVENOUS at 16:36

## 2023-03-03 RX ADMIN — CHLORHEXIDINE GLUCONATE 15 ML: 1.2 RINSE ORAL at 20:08

## 2023-03-03 RX ADMIN — OLANZAPINE 20 MG: 10 TABLET, FILM COATED ORAL at 21:43

## 2023-03-03 RX ADMIN — ACETAMINOPHEN 975 MG: 325 TABLET ORAL at 21:43

## 2023-03-03 RX ADMIN — FENTANYL CITRATE 50 MCG: 50 INJECTION, SOLUTION INTRAMUSCULAR; INTRAVENOUS at 16:23

## 2023-03-03 RX ADMIN — ONDANSETRON 4 MG: 2 INJECTION INTRAMUSCULAR; INTRAVENOUS at 16:30

## 2023-03-03 RX ADMIN — SERTRALINE HYDROCHLORIDE 50 MG: 50 TABLET ORAL at 21:43

## 2023-03-03 RX ADMIN — SODIUM CHLORIDE 3 G: 9 INJECTION, SOLUTION INTRAVENOUS at 16:15

## 2023-03-03 NOTE — PROGRESS NOTES
58M s/p incision and drainage of left buccal space hematoma likely a/w previous I&D in the area of the left mental nerve, and extraction of tooth #19  Pt is currently hemastatic  Plan:  - cont  Unasyn   - pain control per primary team    - Folded 4x4 gauze under pressure over extraction site prn for post op ooze  Another 4x4 gauze folded and placed into the left buccal vestibule to reduce post op hematoma risk  - Pressure to left cheek for first 24 hours post op   - mechanical soft diet as tolerated     - maintain good OH, warm saline rinses prn after meals

## 2023-03-03 NOTE — UTILIZATION REVIEW
,  Initial Clinical Review    Admission: Date/Time/Statement:   Admission Orders (From admission, onward)     Ordered        03/02/23 1426  Inpatient Admission  Once                      Orders Placed This Encounter   Procedures   • Inpatient Admission     Standing Status:   Standing     Number of Occurrences:   1     Order Specific Question:   Level of Care     Answer:   Med Surg [16]     Order Specific Question:   Estimated length of stay     Answer:   More than 2 Midnights     Order Specific Question:   Certification     Answer:   I certify that inpatient services are medically necessary for this patient for a duration of greater than two midnights  See H&P and MD Progress Notes for additional information about the patient's course of treatment  ED Arrival Information     Patient not seen in ED                     No chief complaint on file  Initial Presentation: 66 y o  male with hx schizoaffective disorder, bipolar type who presents as transfer from [employer] to [facility] for higher level of care   Pt reports he broke a tooth a few days ago and started with increased pain and swelling soon after  Pt seen in [facility] ED and area drained , placed on abx   Pts swelling returned so he presented to [facility] ED 3/1  CT facial bones  Fransisco showed periapical and mandibular abscesses, signs of sialoadenitis, L facial cellulitis    On exam,  Bret, pt with tenderness and  facial swelling, erythema L sided, abnormal dentition  Denies difficulty swallowing, no drooling or SOB   Labs WBC 10 87 at [facility]   K 3 2   Pt admitted as inpatient with periapical abscess with facial involvement   Plan - OMFS consult, IV Unasyn, pain control, CL diet, NPO after MN, replete K, BMP in am     OMFS consult- left facial cellulitis and left buccal space abscess a/w tooth #19  On exam,  left buccal mucosal swelling and tenderness with vestibular, floor of mouth is soft with no palpable masses, tongue protrusion is midline and has full range of motion, no pharyngeal edema or exudate   fully dentate with poor oral hygiene, heavily restored dentition, #19 +TTP, root tips #3 and #14  No other grossly carious or fracture teeth  Left submandibular lymphadenopathy  To OR tomorrow for I and D   NPO after MN, warm compresses to face  Date: 3/3  Day 2:   Pt for OR today at approx 1530, continue IV Unasyn, warm compresses to face   NPO- IVF infusing   Pain control  WBC up to 12 24 today   Potassium WNL   Pt reports feeling tired, no change in edema from yesterday  Edema, erythema, tenderness over left mandibular and maxilla area  No fluctuance  For CBC, BMP in am           Initial  Vitals   Temperature Pulse Respirations Blood Pressure SpO2   03/02/23 1335 03/02/23 1335 03/02/23 1335 03/02/23 1335 03/02/23 1335   98 3 °F (36 8 °C) 90 16 148/98 96 %      Temp Source Heart Rate Source Patient Position - Orthostatic VS BP Location FiO2 (%)   03/02/23 2159 03/02/23 2159 03/02/23 2159 03/02/23 1351 --   Oral Monitor Lying Left arm       Pain Score       03/02/23 1245       5          Wt Readings from Last 1 Encounters:   03/02/23 77 1 kg (170 lb)     Additional Vital Signs:   Date/Time Temp Pulse Resp BP MAP (mmHg) SpO2 Patient Position - Orthostatic VS   03/03/23 07:47:07 98 8 °F (37 1 °C) 76 -- 129/80 96 93 % --   03/02/23 21:59:08 98 2 °F (36 8 °C) 88 19 144/97 113 96 % Lying   03/02/23 14:51:55 99 7 °F (37 6 °C) 87 21 155/98 117 96 % --   03/02/23 1351 -- -- -- 128/82 --       Pertinent Labs/Diagnostic Test Results:   3/2 [address] [place] [address],[facility] Floor- CT facial bones-   Extensive left facial cellulitis in the mandibular and submandibular region  2   Left mandibular subperiosteal abscess measuring 1 1 x 0 3 x 0 9 cm adjacent to the 1st molar  3   Left mandibular 1st molar periapical abscess with buccal cortical destruction suggesting odontogenic source of infection  4   Left submandibular sialoadenitis likely due to surrounding cellulitis    Suggestion of mild right submandibular sialoadenitis      Results from last 7 days   Lab Units 03/03/23  0601 03/01/23  2351   WBC Thousand/uL 12 24* 10 87*   HEMOGLOBIN g/dL 15 1 15 3   HEMATOCRIT % 46 3 44 6   PLATELETS Thousands/uL 221 253   NEUTROS ABS Thousands/µL  --  8 51*         Results from last 7 days   Lab Units 03/03/23  0601 03/01/23  2351   SODIUM mmol/L 139 137   POTASSIUM mmol/L 4 3 3 2*   CHLORIDE mmol/L 109* 103   CO2 mmol/L 23 19*   ANION GAP mmol/L 7 15*   BUN mg/dL 9 10   CREATININE mg/dL 1 27 1 30   EGFR ml/min/1 73sq m 62 60   CALCIUM mg/dL 8 8 9 2     Results from last 7 days   Lab Units 03/01/23  2351   AST U/L 39   ALT U/L 25   ALK PHOS U/L 92   TOTAL PROTEIN g/dL 7 2   ALBUMIN g/dL 4 3   TOTAL BILIRUBIN mg/dL 0 87         Results from last 7 days   Lab Units 03/03/23  0601 03/01/23  2351   GLUCOSE RANDOM mg/dL 108 136               Results from last 7 days   Lab Units 03/01/23  2351   PROTIME seconds 13 2   INR  0 97   PTT seconds 30         Results from last 7 days   Lab Units 03/01/23  2351   PROCALCITONIN ng/ml 0 06     Results from last 7 days   Lab Units 03/01/23  2351   LACTIC ACID mmol/L 1 2                   Results from last 7 days   Lab Units 03/01/23  2351   BLOOD CULTURE  No Growth at 24 hrs  No Growth at 24 hrs                     Past Medical History:   Diagnosis Date   • Alcohol abuse 9/7/2016   • Anxiety    • Bipolar disorder ([email or web address] )    • Depression    • Psychiatric disorder    • Psychiatric illness    • Psychotic disorder ([email or web address] ) 3/29/2016   • Schizoaffective disorder ([email or web address] )      Present on Admission:  • Facial cellulitis with left buccal space abscess  • Schizoaffective disorder, bipolar type ([email or web address] 75 )  • (Resolved) Hypokalemia      Admitting Diagnosis: Facial abscess [L02 01]  Age/Sex: 66 y o  male  Admission Orders:  Scheduled Medications:  acetaminophen, 975 mg, Oral, Q8H ROSAURA  ampicillin-sulbactam, 3 g, Intravenous, Q6H  enoxaparin, 40 mg, Subcutaneous, Daily  OLANZapine, 20 mg, Oral, HS  sertraline, 50 mg, Oral, Daily    potassium chloride 20 mEq IVPB (premix)  Dose: 20 mEq  Freq: Every 2 hours Route: IV  Last Dose: 20 mEq (03/02/23 1826)  Start: 03/02/23 1445 End: 03/02/23 2026  Continuous IV Infusions:  sodium chloride, 75 mL/hr, Intravenous, Continuous      PRN Meds:  ketorolac, 15 mg, Intravenous, Q6H PRN x2 3/2  ondansetron, 4 mg, Intravenous, Q6H PRN  oxyCODONE, 5 mg, Oral, Q6H PRN    NPO   OOB as hamzah      IP CONSULT TO ORAL AND MAXILLOFACIAL SURGERY    Network Utilization Review Department  ATTENTION: Please call with any questions or concerns to [phone] and carefully listen to the prompts so that you are directed to the right person  All voicemails are confidential   Yvon Chen all requests for admission clinical reviews, approved or denied determinations and any other requests to dedicated fax number below belonging to the campus where the patient is receiving treatment   List of dedicated fax numbers for the Facilities:  [address] DENIALS (Administrative/Medical Necessity) [phone]   [address] (Maternity/NICU/Pediatrics) [phone]   [address]17 Marcella Ugalde [phone]   [facility] [place] [address] [phone]   [address] [place] [address] [facility] [phone]   [address] [facility] 134 815 [facility] [phone]

## 2023-03-03 NOTE — OP NOTE
OPERATIVE REPORT  PATIENT NAME: Rashmi Clark    :  1965  MRN: [id number]  Pt Location: AN OR ROOM 03    SURGERY DATE: 3/3/2023    Surgeon(s) and Role:     * Sly Walton DDS - Primary    Preop Diagnosis:  Periapical abscess with facial involvement [K04 7]    Post-Op Diagnosis Codes:     * Periapical abscess with facial involvement [K04 7]    Procedure(s):  Left - INCISION AND DRAINAGE  (I&D) LEFT LOWER FACIAL ABSCESS    Specimen(s):  * No specimens in log *    Estimated Blood Loss:   Minimal    Drains:  * No LDAs found *    Anesthesia Type:   General    Operative Indications:  Periapical abscess with facial involvement [K04 7]      Operative Findings:  Mobile #19 with, hematoma in buccal vestibule  Possibly from previous incision and drainage in area of mental nerve  Complications:   None    Procedure and Technique:  Informed consent obtained  Patient intubated with anesthesia, sterile drape placed  Time out performed  Local anesthesia given, throat pack and bite block positioned  Sulcular incision from 21 to 18 made  Full thinckness mucoperiosteal flap opened with hematoma/ blood clots expressed from area  No obvious dieter exudate present  #19 extracted with forceps and elevation  Curettage, irrigation, debridement of area  Closed with 3-0 cg  Patient extubated and transferred to PACU       I was present for the entire procedure    Patient Disposition:  PACU         SIGNATURE: Sly Walton DDS  DATE: March 3, 2023  TIME: 4:53 PM

## 2023-03-03 NOTE — ANESTHESIA PREPROCEDURE EVALUATION
Procedure:  INCISION AND DRAINAGE  (I&D) WOUND ORAL (Left: Face)  EXTRACTION TEETH MULTIPLE (Mouth)    Relevant Problems   No relevant active problems      L facial cellulitis & tooth abscess for I&D    PMHx of schizoaffective disorder, bipolar disorder  Physical Exam    Airway    Mallampati score: unable to assess  TM Distance: >3 FB  Neck ROM: limited     Dental       Cardiovascular      Pulmonary      Other Findings        Anesthesia Plan  ASA Score- 2     Anesthesia Type- general with ASA Monitors  Additional Monitors:   Airway Plan: ETT  Comment: Patient seen and examined  History reviewed  Patient to be done under general anesthesia with ETT and routine monitors  Risks discussed with the patient  Consent obtained          Plan Factors-Exercise tolerance (METS): >4 METS  Chart reviewed  Patient summary reviewed  Induction- intravenous  Postoperative Plan-     Informed Consent- Anesthetic plan and risks discussed with patient  I personally reviewed this patient with the CRNA  Discussed and agreed on the Anesthesia Plan with the CRNA  Satish Wilkes

## 2023-03-03 NOTE — ANESTHESIA POSTPROCEDURE EVALUATION
Post-Op Assessment Note    CV Status:  Stable    Pain management: adequate     Mental Status:  Sleepy   Hydration Status:  Euvolemic   PONV Controlled:  Controlled   Airway Patency:  Patent      Post Op Vitals Reviewed: Yes      Staff: Anesthesiologist, CRNA         No notable events documented      BP     Temp      Pulse    Resp      SpO2

## 2023-03-03 NOTE — APP STUDENT NOTE
KASEY STUDENT  Inpatient Progress Note for TRAINING ONLY  Not Part of Legal Medical Record     Progress Note - Kojo Galloway 66 y o  male MRN: [id number]  Unit/Bed#: W -01 Encounter: [id number]        No new Assessment & Plan notes have been filed under this hospital service since the last note was generated  Service: Internal Medicine     * Periapical abscess with facial involvement  Assessment & Plan  • Patient reports likely breaking a tooth on a granola bar a few days ago  Had facial swelling and pain and was seen at [facility] and the abscess was drained  Reports swelling came back worse shortly after so he went to [facility] ED and was transferred here  • CT scan with facial cellulitis and periapical abscess and submandibular abscess as well as signs of sialoadenitis  ? Admit patient to med/surg under inpatient status   ? OMFS consult   ? Unasyn IV Q6 - Blood cultures pending on 3/03  ? Pain control   ? Clear liquid   Patient seen by OMFS on 3/02:  ? NPO for upcoming procedure   ? OR 3/03 for I&D with Dr Corina Chaudhary  ? Warm compress to face PRN  ? Pain control      Schizoaffective disorder, bipolar type [facility])  Assessment & Plan  • Appears stable at this time   ? Continue Zyprexa and Zoloft      Hypokalemia - resolved  Assessment & Plan  • Noted at 3 2 on 3/02  ? Given 40 mEq KCl IV on 3/02  ? BMP in AM -- K at 4 3 on 3/03    VTE Pharmacologic Prophylaxis:   Pharmacologic: Enoxaparin (Lovenox)  Mechanical VTE Prophylaxis in Place: No    Patient Centered Rounds: I have evaluated patient without nursing staff present due to n/a    Discussions with Specialists or Other Care Team Provider: BRUNO    Education and Discussions with Family / Patient: patient questions answered at bedside    Time Spent for Care: 45 minutes  More than 50% of total time spent on counseling and coordination of care as described above      Current Length of Stay: 1 day(s)    Current Patient Status: Inpatient   Certification Statement: The patient will continue to require additional inpatient hospital stay due to OMFS procedure, continued antibiotics and pending blood cultures    Discharge Plan: discharge to home once condition has been adequately treated    Code Status: Level 1 - Full Code    Subjective:   Magali Mike is a 66 y o  male PMH HTN, alcohol abuse, schizoaffective disorder bipolar type, on hospital day 1, admitted for periapical abscess with facial involvement  He presented to the [facility] ED on 3/02 with facial swelling x 2 weeks, and was transferred to [facility] for OMFS  Patient was seen by internal medicine and OMFS yesterday  Patient is scheduled for I&D this morning  Today, patient reports no issues overnight  He is at a 7/10 pain today, improved from yesterday  He has received oxycodone PO, ketorolac IV, and acetaminophen PO yesterday for pain  He has not taken anything for the pain today  He had great sleep overnight, and is tired this morning  He reports getting poor sleep the night before last  He tolerated a liquid diet yesterday, and has been NPO this morning  He has not ambulated, due to his IV  He reports no issues with urination  He is aware of his I&D procedure occurring today  He reports being under anesthesia in the past, and had no issues  Objective:   Vitals:   Temp (24hrs), Av 7 °F (37 1 °C), Min:98 2 °F (36 8 °C), Max:99 7 °F (37 6 °C)    Temp:  [98 2 °F (36 8 °C)-99 7 °F (37 6 °C)] 98 2 °F (36 8 °C)  HR:  [87-90] 88  Resp:  [16-21] 19  BP: (128-166)/(82-98) 144/97  SpO2:  [96 %-98 %] 96 %  There is no height or weight on file to calculate BMI  Input and Output Summary (last 24 hours): Intake/Output Summary (Last 24 hours) at 3/3/2023 3529  Last data filed at 3/3/2023 8053  Gross per 24 hour   Intake --   Output 1500 ml   Net -1500 ml       Physical Exam:   Physical Exam  Constitutional:       General: He is awake  He is not in acute distress  Appearance: He is overweight   He is ill-appearing  He is not toxic-appearing  HENT:      Head: Normocephalic and atraumatic  No left periorbital erythema  Jaw: No trismus  Salivary Glands: Left salivary gland is diffusely enlarged  Nose: No congestion or rhinorrhea  Mouth/Throat:      Mouth: Mucous membranes are moist       Dentition: Dental caries present  Pharynx: Oropharynx is clear  No oropharyngeal exudate  Eyes:      General: No scleral icterus  Right eye: No discharge  Left eye: No discharge  Extraocular Movements: Extraocular movements intact  Neck:      Trachea: Trachea normal  No tracheal tenderness  Comments: Marked swelling on left face, located along left mandible, extending superior and inferiorly  Swelling does not impact eye movements  Cardiovascular:      Rate and Rhythm: Normal rate and regular rhythm  Heart sounds: No murmur heard  No friction rub  No gallop  Pulmonary:      Effort: Pulmonary effort is normal  No respiratory distress  Breath sounds: No wheezing, rhonchi or rales  Comments: Split S2 with inspiration  Abdominal:      Palpations: Abdomen is soft  Tenderness: There is no abdominal tenderness  There is no guarding or rebound  Musculoskeletal:      Cervical back: Normal range of motion  Erythema present  No rigidity  Normal range of motion  Right lower leg: No edema  Left lower leg: No edema  Skin:     General: Skin is warm  Capillary Refill: Capillary refill takes less than 2 seconds  Neurological:      General: No focal deficit present  Mental Status: He is oriented to person, place, and time and easily aroused  Mental status is at baseline  Psychiatric:         Behavior: Behavior is cooperative         Historical Information   Past Medical History:   Diagnosis Date   • Alcohol abuse 9/7/2016   • Anxiety    • Bipolar disorder ([facility] [facility] )    • Depression    • Psychiatric disorder    • Psychiatric illness    • Psychotic disorder ([facility] [facility] ) 3/29/2016   • Schizoaffective disorder [facility])      Past Surgical History:   Procedure Laterality Date   • ORIF WRIST FRACTURE Left 9/8/2016    Procedure: OPEN REDUCTION W/ INTERNAL FIXATION (ORIF) RADIUS / ULNA (WRIST); Surgeon: Hendricks Fothergill, MD;  Location: [facility];  Service:      Social History   Social History     Substance and Sexual Activity   Alcohol Use Yes   • Alcohol/week: 5 0 standard drinks   • Types: 5 Cans of beer per week    Comment: occasionally     Social History     Substance and Sexual Activity   Drug Use Yes   • Types: Marijuana    Comment: pt states he has a "certificate" from the [employer] [address] Use   Smoking Status Former   Smokeless Tobacco Never     Family History: non-contributory    Meds/Allergies   all medications and allergies reviewed  Allergies   Allergen Reactions   • Haldol [Haloperidol]      Pt reports "I dont like it"   • Other      Triple antibiotic ointment  Additional Data:     Labs:    Results from last 7 days   Lab Units 03/03/23  0601 03/01/23  2351   WBC Thousand/uL 12 24* 10 87*   HEMOGLOBIN g/dL 15 1 15 3   HEMATOCRIT % 46 3 44 6   PLATELETS Thousands/uL 221 253   NEUTROS PCT %  --  78*   LYMPHS PCT %  --  13*   MONOS PCT %  --  8   EOS PCT %  --  0     Results from last 7 days   Lab Units 03/03/23  0601 03/01/23  2351   SODIUM mmol/L 139 137   POTASSIUM mmol/L 4 3 3 2*   CHLORIDE mmol/L 109* 103   CO2 mmol/L 23 19*   BUN mg/dL 9 10   CREATININE mg/dL 1 27 1 30   ANION GAP mmol/L 7 15*   CALCIUM mg/dL 8 8 9 2   ALBUMIN g/dL  --  4 3   TOTAL BILIRUBIN mg/dL  --  0 87   ALK PHOS U/L  --  92   ALT U/L  --  25   AST U/L  --  39   GLUCOSE RANDOM mg/dL 108 136     Results from last 7 days   Lab Units 03/01/23  2351   INR  0 97             Results from last 7 days   Lab Units 03/01/23  2351   LACTIC ACID mmol/L 1 2   PROCALCITONIN ng/ml 0 06         * I Have Reviewed All Lab Data Listed Above    * Additional Pertinent Lab Tests Reviewed: All Labs Within Last 24 Hours Reviewed    Imaging:    Imaging Reports Reviewed Today Include: N/A no new imaging in 24H  Imaging Personally Reviewed by Myself Includes: N/A no new imaging in 24H    Recent Cultures (last 7 days):     Results from last 7 days   Lab Units 03/01/23  2351   BLOOD CULTURE  Received in Microbiology Lab  Culture in Progress  Received in Microbiology Lab  Culture in Progress  Last 24 Hours Medication List:   Current Facility-Administered Medications   Medication Dose Route Frequency Provider Last Rate   • acetaminophen  975 mg Oral [facility] Heraclio Lima PA-C     • ampicillin-sulbactam  3 g Intravenous Q6H Roxene Staple, PA-C 3 g (03/03/23 0137)   • enoxaparin  40 mg Subcutaneous Daily Heraclio Lima PA-C     • ketorolac  15 mg Intravenous Q6H PRN Roxene Staple, PA-C     • OLANZapine  20 mg Oral HS Heraclio Lima PA-C     • ondansetron  4 mg Intravenous Q6H PRN Roxene Staple, PA-C     • oxyCODONE  5 mg Oral Q6H PRN Roxene Staple, PA-C     • sertraline  50 mg Oral Daily Heraclio Lima PA-C     • sodium chloride  75 mL/hr Intravenous Continuous Roxene Staple, PA-C 75 mL/hr (03/03/23 0645)        Today, Patient Was Seen By: Kathy Cordero    ** Please Note: Dictation voice to text software may have been used in the creation of this document   **

## 2023-03-03 NOTE — ASSESSMENT & PLAN NOTE
· Patient reported likely breaking a tooth on a granola bar a few days ago  · Had facial swelling and pain and was seen at [facility] ER and the abscess was drained  · Edema returned - presented to [facility] and transferred to [facility] 3/2  · CT scan: Extensive left facial cellulitis in the mandibular and submandibular region  Left mandibular subperiosteal abscess measuring 1 1 x 0 3 x 0 9 cm adjacent to the 1st molar  Left mandibular 1st molar periapical abscess with buccal cortical destruction suggesting odontogenic source of infection  Left submandibular sialoadenitis likely due to surrounding cellulitis  Suggestion of mild right submandibular sialoadenitis    · OMFS to take to OR for I&D today  · Continue IV unasyn, day #2  · Warm compress to face PRN  · Pain control

## 2023-03-03 NOTE — PROGRESS NOTES
[facility]  Progress Note - Dora Bustamante 1965, 66 y o  male MRN: [id number]  Unit/Bed#: W -01 Encounter: [id number]  Primary Care Provider: Shahbaz [place] DO   Date and time admitted to hospital: 3/2/2023  1:31 PM    * Facial cellulitis with left buccal space abscess  Assessment & Plan  · Patient reported likely breaking a tooth on a granola bar a few days ago  · Had facial swelling and pain and was seen at [facility] and the abscess was drained  · Edema returned - presented to [facility] and transferred to [facility] 3/2  · CT scan: Extensive left facial cellulitis in the mandibular and submandibular region  Left mandibular subperiosteal abscess measuring 1 1 x 0 3 x 0 9 cm adjacent to the 1st molar  Left mandibular 1st molar periapical abscess with buccal cortical destruction suggesting odontogenic source of infection  Left submandibular sialoadenitis likely due to surrounding cellulitis  Suggestion of mild right submandibular sialoadenitis  · OMFS to take to OR for I&D today  · Continue IV unasyn, day #2  · Warm compress to face PRN  · Pain control    Leukocytosis  Assessment & Plan  · Secondary to facial cellulitis    Schizoaffective disorder, bipolar type ([facility] [address] )  Assessment & Plan  · Continue Zyprexa and Zoloft     Hypokalemia-resolved as of 3/3/2023  Assessment & Plan  · repleted and resolved      VTE Pharmacologic Prophylaxis: VTE Score: 3 Moderate Risk (Score 3-4) - Pharmacological DVT Prophylaxis Ordered: enoxaparin (Lovenox)  Patient Centered Rounds: I performed bedside rounds with nursing staff today  Discussions with Specialists or Other Care Team Provider: nursing    Education and Discussions with Family / Patient: Patient declined call to        Total Time Spent on Date of Encounter in care of patient: 35 minutes This time was spent on one or more of the following: performing physical exam; counseling and coordination of care; obtaining or reviewing history; documenting in the medical record; reviewing/ordering tests, medications or procedures; communicating with other healthcare professionals and discussing with patient's family/caregivers  Current Length of Stay: 1 day(s)  Current Patient Status: Inpatient   Certification Statement: The patient will continue to require additional inpatient hospital stay due to IV ABX, OR for I&D  Discharge Plan: Anticipate discharge tomorrow to home  Code Status: Level 1 - Full Code    Subjective:   Feeling okay - tired but overall pain well controlled  Edema still t he same as yesterday    Objective:     Vitals:   Temp (24hrs), Av 8 °F (37 1 °C), Min:98 2 °F (36 8 °C), Max:99 7 °F (37 6 °C)    Temp:  [98 2 °F (36 8 °C)-99 7 °F (37 6 °C)] 98 8 °F (37 1 °C)  HR:  [76-90] 76  Resp:  [16-21] 19  BP: (128-155)/(80-98) 129/80  SpO2:  [93 %-96 %] 93 %  There is no height or weight on file to calculate BMI  Input and Output Summary (last 24 hours): Intake/Output Summary (Last 24 hours) at 3/3/2023 1256  Last data filed at 3/3/2023 0700  Gross per 24 hour   Intake --   Output 1500 ml   Net -1500 ml       Physical Exam:   Physical Exam  Vitals and nursing note reviewed  Constitutional:       General: He is not in acute distress  Appearance: Normal appearance  He is not diaphoretic  HENT:      Head: Normocephalic and atraumatic  Comments: Edema, erythema, tenderness over left mandibular and maxilla area  No fluctuance     Mouth/Throat:      Mouth: Mucous membranes are moist    Cardiovascular:      Rate and Rhythm: Normal rate and regular rhythm  Pulmonary:      Effort: Pulmonary effort is normal       Breath sounds: Normal breath sounds  No stridor  No wheezing, rhonchi or rales  Abdominal:      General: Bowel sounds are normal       Palpations: Abdomen is soft  There is no mass  Tenderness: There is no abdominal tenderness  There is no guarding     Musculoskeletal:      Right lower leg: No edema  Left lower leg: No edema  Skin:     General: Skin is warm and dry  Neurological:      Mental Status: He is alert  Psychiatric:         Mood and Affect: Mood normal          Behavior: Behavior normal           Additional Data:     Labs:  Results from last 7 days   Lab Units 03/03/23  0601 03/01/23  2351   WBC Thousand/uL 12 24* 10 87*   HEMOGLOBIN g/dL 15 1 15 3   HEMATOCRIT % 46 3 44 6   PLATELETS Thousands/uL 221 253   NEUTROS PCT %  --  78*   LYMPHS PCT %  --  13*   MONOS PCT %  --  8   EOS PCT %  --  0     Results from last 7 days   Lab Units 03/03/23  0601 03/01/23  2351   SODIUM mmol/L 139 137   POTASSIUM mmol/L 4 3 3 2*   CHLORIDE mmol/L 109* 103   CO2 mmol/L 23 19*   BUN mg/dL 9 10   CREATININE mg/dL 1 27 1 30   ANION GAP mmol/L 7 15*   CALCIUM mg/dL 8 8 9 2   ALBUMIN g/dL  --  4 3   TOTAL BILIRUBIN mg/dL  --  0 87   ALK PHOS U/L  --  92   ALT U/L  --  25   AST U/L  --  39   GLUCOSE RANDOM mg/dL 108 136     Results from last 7 days   Lab Units 03/01/23  2351   INR  0 97             Results from last 7 days   Lab Units 03/01/23  2351   LACTIC ACID mmol/L 1 2   PROCALCITONIN ng/ml 0 06       Lines/Drains:  Invasive Devices     Peripheral Intravenous Line  Duration           Peripheral IV 03/01/23 Distal;Right;Upper;Ventral (anterior) Arm 1 day                      Imaging: Reviewed radiology reports from this admission including: CT head    Recent Cultures (last 7 days):   Results from last 7 days   Lab Units 03/01/23  2351   BLOOD CULTURE  No Growth at 24 hrs  No Growth at 24 hrs         Last 24 Hours Medication List:   Current Facility-Administered Medications   Medication Dose Route Frequency Provider Last Rate   • acetaminophen  975 mg Oral [facility] Heraclio Raygoza PA-C     • ampicillin-sulbactam  3 g Intravenous Q6H Lamar Barrientos PA-C 3 g (03/03/23 0984)   • enoxaparin  40 mg Subcutaneous Daily Heraclio Raygoza PA-C     • ketorolac  15 mg Intravenous Q6H PRN Richar Jaime BRUNO Villarreal     • OLANZapine  20 mg Oral HS Heracliocruz Raygoza PA-C     • ondansetron  4 mg Intravenous Q6H PRN Lamar Barrientos PA-C     • oxyCODONE  5 mg Oral Q6H PRN Lamar Barrientos PA-C     • sertraline  50 mg Oral Daily Heracliocruz Raygoza PA-C     • sodium chloride  75 mL/hr Intravenous Continuous Lamar Barrientos PA-C 75 mL/hr (03/03/23 0645)        Today, Patient Was Seen By: Cortney Alegria PA-C    **Please Note: This note may have been constructed using a voice recognition system  **

## 2023-03-04 VITALS
TEMPERATURE: 98.2 F | RESPIRATION RATE: 18 BRPM | DIASTOLIC BLOOD PRESSURE: 109 MMHG | SYSTOLIC BLOOD PRESSURE: 153 MMHG | HEART RATE: 81 BPM | OXYGEN SATURATION: 93 %

## 2023-03-04 PROBLEM — D72.829 LEUKOCYTOSIS: Status: RESOLVED | Noted: 2023-03-03 | Resolved: 2023-03-04

## 2023-03-04 LAB
ANION GAP SERPL CALCULATED.3IONS-SCNC: 7 MMOL/L (ref 4–13)
BASOPHILS # BLD AUTO: 0.01 THOUSANDS/ÂΜL (ref 0–0.1)
BASOPHILS NFR BLD AUTO: 0 % (ref 0–1)
BUN SERPL-MCNC: 11 MG/DL (ref 5–25)
CALCIUM SERPL-MCNC: 8.4 MG/DL (ref 8.4–10.2)
CHLORIDE SERPL-SCNC: 108 MMOL/L (ref 96–108)
CO2 SERPL-SCNC: 20 MMOL/L (ref 21–32)
CREAT SERPL-MCNC: 1.04 MG/DL (ref 0.6–1.3)
EOSINOPHIL # BLD AUTO: 0 THOUSAND/ÂΜL (ref 0–0.61)
EOSINOPHIL NFR BLD AUTO: 0 % (ref 0–6)
ERYTHROCYTE [DISTWIDTH] IN BLOOD BY AUTOMATED COUNT: 12.8 % (ref 11.6–15.1)
GFR SERPL CREATININE-BSD FRML MDRD: 79 ML/MIN/1.73SQ M
GLUCOSE SERPL-MCNC: 138 MG/DL (ref 65–140)
HCT VFR BLD AUTO: 40.8 % (ref 36.5–49.3)
HGB BLD-MCNC: 13.2 G/DL (ref 12–17)
IMM GRANULOCYTES # BLD AUTO: 0.07 THOUSAND/UL (ref 0–0.2)
IMM GRANULOCYTES NFR BLD AUTO: 1 % (ref 0–2)
LYMPHOCYTES # BLD AUTO: 0.76 THOUSANDS/ÂΜL (ref 0.6–4.47)
LYMPHOCYTES NFR BLD AUTO: 8 % (ref 14–44)
MCH RBC QN AUTO: 28.5 PG (ref 26.8–34.3)
MCHC RBC AUTO-ENTMCNC: 32.4 G/DL (ref 31.4–37.4)
MCV RBC AUTO: 88 FL (ref 82–98)
MONOCYTES # BLD AUTO: 0.34 THOUSAND/ÂΜL (ref 0.17–1.22)
MONOCYTES NFR BLD AUTO: 4 % (ref 4–12)
NEUTROPHILS # BLD AUTO: 8.64 THOUSANDS/ÂΜL (ref 1.85–7.62)
NEUTS SEG NFR BLD AUTO: 87 % (ref 43–75)
NRBC BLD AUTO-RTO: 0 /100 WBCS
PLATELET # BLD AUTO: 210 THOUSANDS/UL (ref 149–390)
PMV BLD AUTO: 8.5 FL (ref 8.9–12.7)
POTASSIUM SERPL-SCNC: 4.3 MMOL/L (ref 3.5–5.3)
RBC # BLD AUTO: 4.63 MILLION/UL (ref 3.88–5.62)
SODIUM SERPL-SCNC: 135 MMOL/L (ref 135–147)
WBC # BLD AUTO: 9.82 THOUSAND/UL (ref 4.31–10.16)

## 2023-03-04 RX ORDER — AMOXICILLIN AND CLAVULANATE POTASSIUM 875; 125 MG/1; MG/1
1 TABLET, FILM COATED ORAL EVERY 12 HOURS SCHEDULED
Qty: 14 TABLET | Refills: 0 | Status: SHIPPED | OUTPATIENT
Start: 2023-03-04 | End: 2023-03-04 | Stop reason: SDUPTHER

## 2023-03-04 RX ORDER — AMOXICILLIN AND CLAVULANATE POTASSIUM 875; 125 MG/1; MG/1
1 TABLET, FILM COATED ORAL EVERY 12 HOURS SCHEDULED
Qty: 14 TABLET | Refills: 0 | Status: SHIPPED | OUTPATIENT
Start: 2023-03-04 | End: 2023-03-11

## 2023-03-04 RX ORDER — ACETAMINOPHEN 325 MG/1
650 TABLET ORAL EVERY 6 HOURS PRN
Refills: 0
Start: 2023-03-04

## 2023-03-04 RX ADMIN — ENOXAPARIN SODIUM 40 MG: 40 INJECTION SUBCUTANEOUS at 08:57

## 2023-03-04 RX ADMIN — ACETAMINOPHEN 975 MG: 325 TABLET ORAL at 15:41

## 2023-03-04 RX ADMIN — CHLORHEXIDINE GLUCONATE 15 ML: 1.2 RINSE ORAL at 08:57

## 2023-03-04 RX ADMIN — SODIUM CHLORIDE 3 G: 9 INJECTION, SOLUTION INTRAVENOUS at 04:22

## 2023-03-04 RX ADMIN — SODIUM CHLORIDE 3 G: 9 INJECTION, SOLUTION INTRAVENOUS at 09:00

## 2023-03-04 RX ADMIN — SODIUM CHLORIDE 3 G: 9 INJECTION, SOLUTION INTRAVENOUS at 16:59

## 2023-03-04 RX ADMIN — ACETAMINOPHEN 975 MG: 325 TABLET ORAL at 05:03

## 2023-03-04 RX ADMIN — SODIUM CHLORIDE 75 ML/HR: 0.9 INJECTION, SOLUTION INTRAVENOUS at 12:25

## 2023-03-04 NOTE — ASSESSMENT & PLAN NOTE
· Patient reported likely breaking a tooth on a granola bar a few days ago  · Had facial swelling and pain and was seen at [facility] ER and the abscess was drained  · Edema returned - presented to [facility] and transferred to [facility] 3/2  · CT scan: Extensive left facial cellulitis in the mandibular and submandibular region  Left mandibular subperiosteal abscess measuring 1 1 x 0 3 x 0 9 cm adjacent to the 1st molar  Left mandibular 1st molar periapical abscess with buccal cortical destruction suggesting odontogenic source of infection  Left submandibular sialoadenitis likely due to surrounding cellulitis  Suggestion of mild right submandibular sialoadenitis    · OMFS took to OR on 3/3 - found hematoma in buccal vestibule  · S/P IV unasyn x 3 days  · Discharge with augmentin for 7 days  · Warm compress to face PRN  · Follow up with OMFS

## 2023-03-04 NOTE — DISCHARGE INSTR - AVS FIRST PAGE
Dear Heidi Smyth,     It was our pleasure to care for you here at [facility]  It is our hope that we were always able to exceed the expected standards for your care during your stay  You were hospitalized due to facial cellulitis  You were cared for on the [address] [place] [address] 4th floor by Kavita Avila PA-C under the service of Swapna Keys MD with the [facility] who covers for your primary care physician (PCP), Molly Fiore DO, while you were hospitalized  If you have any questions or concerns related to this hospitalization, you may contact us at [phone]  For follow up as well as any medication refills, we recommend that you follow up with your primary care physician  A registered nurse will reach out to you by phone within a few days after your discharge to answer any additional questions that you may have after going home  However, at this time we provide for you here, the most important instructions / recommendations at discharge:     Notable Medication Adjustments -   Take augmentin twice a day for 7 days  Testing Required after Discharge -   none  Important follow up information -   Follow up with PCP and oral surgery  Other Instructions -   If you develop worsening facial swelling or redness, come to the ER or call the oral surgeon  Please review this entire after visit summary as additional general instructions including medication list, appointments, activity, diet, any pertinent wound care, and other additional recommendations from your care team that may be provided for you        Sincerely,     Kavita Avila PA-C

## 2023-03-04 NOTE — DISCHARGE SUMMARY
[facility]  Discharge- [facility] 1965, 66 y o  male MRN: [id number]  Unit/Bed#: W -01 Encounter: [id number]  Primary Care Provider: Rox Hatchet, DO   Date and time admitted to hospital: 3/2/2023  1:31 PM    * Facial cellulitis with left buccal space abscess  Assessment & Plan  · Patient reported likely breaking a tooth on a granola bar a few days ago  · Had facial swelling and pain and was seen at [facility] and the abscess was drained  · Edema returned - presented to [facility] and transferred to [facility] 3/2  · CT scan: Extensive left facial cellulitis in the mandibular and submandibular region  Left mandibular subperiosteal abscess measuring 1 1 x 0 3 x 0 9 cm adjacent to the 1st molar  Left mandibular 1st molar periapical abscess with buccal cortical destruction suggesting odontogenic source of infection  Left submandibular sialoadenitis likely due to surrounding cellulitis  Suggestion of mild right submandibular sialoadenitis    · OMFS took to OR on 3/3 - found hematoma in buccal vestibule  · S/P IV unasyn x 3 days  · Discharge with augmentin for 7 days  · Warm compress to face PRN  · Follow up with OMFS    Leukocytosis-resolved as of 3/4/2023  Assessment & Plan  · Secondary to facial cellulitis  · resolved    Schizoaffective disorder, bipolar type [facility])  Assessment & Plan  · Continue Zyprexa and Zoloft     Medical Problems     Resolved Problems  Date Reviewed: 3/4/2023          Resolved    Hypokalemia 3/3/2023     Resolved by  Bandar Lopez PA-C    Leukocytosis 3/4/2023     Resolved by  Bandar Lopez PA-C        Discharging Physician / Practitioner: Bandar Lopez PA-C  PCP: Rox Hatchet, DO  Admission Date:   Admission Orders (From admission, onward)     Ordered        03/02/23 1426  Inpatient Admission  Once                      Discharge Date: 03/04/23    Consultations During Hospital Stay:  · OMFS    Procedures Performed:   · 3/3/23: s/p incision and drainage of left buccal space hematoma    Significant Findings / Test Results:   · CT facial bones:   Extensive left facial cellulitis in the mandibular and submandibular region  Left mandibular subperiosteal abscess measuring 1 1 x 0 3 x 0 9 cm adjacent to the 1st molar  Left mandibular 1st molar periapical abscess with buccal cortical destruction suggesting odontogenic source of infection  Left submandibular sialoadenitis likely due to surrounding cellulitis  Suggestion of mild right submandibular sialoadenitis  Incidental Findings:   · none     Test Results Pending at Discharge (will require follow up): · Blood cultures     Outpatient Tests Requested:  · Follow up with [facility]    Complications:  none    Reason for Admission: Facial swelling    Hospital Course:   Adam Rivera is a 66 y o  male patient who originally presented to the hospital on 3/2/2023 due to facial cellulitis  Patient was noted to have eaten a granola bar and felt that he broke a tooth  He had facial pain and swelling and was seen at [facility] ER and abscess was noted to be drained  The patient then developed recurrent facial edema and presented to [address] and was transferred to [address] [place] [address] on 3/2/2023  CAT scan was noted to show extensive facial cellulitis in the mandibular and submandibular region with left mandibular subperiosteal abscess and left mandibular first molar periapical abscess  The patient was noted to be seen in consultation by oral maxillofacial surgery and underwent OR evaluation  There was concern for abscess  Ultimately it was noted to show significant hematoma  There was no purulence  The patient will be discharged with Augmentin with additional 7-day course on discharge  He will follow-up with oromaxillofacial surgery as an outpatient  Please see above list of diagnoses and related plan for additional information       Condition at Discharge: stable    Discharge Day Visit / Exam:   Subjective: Feeling well  Facial swelling much improved today  Vitals: Blood Pressure: 120/78 (03/04/23 0700)  Pulse: 65 (03/04/23 0700)  Temperature: 98 8 °F (37 1 °C) (03/04/23 0700)  Temp Source: Oral (03/04/23 0700)  Respirations: 18 (03/04/23 0700)  SpO2: 92 % (03/04/23 0700)  Exam:   Physical Exam  Vitals and nursing note reviewed  Constitutional:       General: He is not in acute distress  Appearance: Normal appearance  He is not diaphoretic  HENT:      Head: Normocephalic and atraumatic  Mouth/Throat:      Mouth: Mucous membranes are moist       Tongue: No lesions  Pharynx: Oropharynx is clear  No pharyngeal swelling  Cardiovascular:      Rate and Rhythm: Normal rate and regular rhythm  Pulmonary:      Effort: Pulmonary effort is normal       Breath sounds: Normal breath sounds  No stridor  No wheezing, rhonchi or rales  Abdominal:      General: Bowel sounds are normal       Palpations: Abdomen is soft  There is no mass  Tenderness: There is no abdominal tenderness  There is no guarding  Musculoskeletal:      Right lower leg: No edema  Left lower leg: No edema  Skin:     General: Skin is warm and dry  Neurological:      Mental Status: He is alert  Psychiatric:         Mood and Affect: Mood normal          Behavior: Behavior normal           Discussion with Family: Patient declined call to   Discharge instructions/Information to patient and family:   See after visit summary for information provided to patient and family  Provisions for Follow-Up Care:  See after visit summary for information related to follow-up care and any pertinent home health orders  Disposition:   home    Planned Readmission: none     Discharge Statement:  I spent 38 minutes discharging the patient  This time was spent on the day of discharge  I had direct contact with the patient on the day of discharge   Greater than 50% of the total time was spent examining patient, answering all patient questions, arranging and discussing plan of care with patient as well as directly providing post-discharge instructions  Additional time then spent on discharge activities  Discharge Medications:  See after visit summary for reconciled discharge medications provided to patient and/or family        **Please Note: This note may have been constructed using a voice recognition system**

## 2023-03-04 NOTE — CASE MANAGEMENT
Case Management Progress Note    Patient name Kojo Galloway  Location W /W -01 MRN [id number]  : 1965 Date 3/4/2023       LOS (days): 2  Geometric Mean LOS (GMLOS) (days): 2 80  Days to GMLOS:0 6        OBJECTIVE:        Current admission status: Inpatient  Preferred Pharmacy:   [address] [place] [address] [phone] [address]  Phone: [phone] Fax: [phone]    [facility], [address] [place] [address], [address]  [id number] [facility]  [address] [place] [address]  Phone: [phone] Fax: Luis [facility] [place] [address]  Phone: [phone] Fax: [phone]    Primary Care Provider: Sera Saravia DO    Primary Insurance: Gayle Desai  Secondary Insurance:     PROGRESS NOTE:  Lyft assistance provided for the d/c of this Pt to his home environment

## 2023-03-07 LAB
BACTERIA BLD CULT: NORMAL
BACTERIA BLD CULT: NORMAL

## 2025-01-13 ENCOUNTER — HOSPITAL ENCOUNTER (EMERGENCY)
Facility: HOSPITAL | Age: 60
Discharge: HOME | End: 2025-01-13
Attending: EMERGENCY MEDICINE
Payer: COMMERCIAL

## 2025-01-13 VITALS
OXYGEN SATURATION: 92 % | RESPIRATION RATE: 20 BRPM | SYSTOLIC BLOOD PRESSURE: 158 MMHG | TEMPERATURE: 98.7 F | HEART RATE: 117 BPM | DIASTOLIC BLOOD PRESSURE: 97 MMHG

## 2025-01-13 DIAGNOSIS — R21 RASH AND NONSPECIFIC SKIN ERUPTION: ICD-10-CM

## 2025-01-13 DIAGNOSIS — L98.9 PSORIASIS-LIKE SKIN DISEASE: Primary | ICD-10-CM

## 2025-01-13 PROCEDURE — 99284 EMERGENCY DEPT VISIT MOD MDM: CPT | Performed by: EMERGENCY MEDICINE

## 2025-01-13 PROCEDURE — 99282 EMERGENCY DEPT VISIT SF MDM: CPT

## 2025-01-13 RX ORDER — HYDROCORTISONE 25 MG/ML
LOTION TOPICAL 2 TIMES DAILY
Qty: 50 ML | Refills: 0 | Status: SHIPPED | OUTPATIENT
Start: 2025-01-13 | End: 2025-01-27

## 2025-01-13 RX ORDER — HYDROCORTISONE 25 MG/G
CREAM TOPICAL ONCE
Status: COMPLETED | OUTPATIENT
Start: 2025-01-13 | End: 2025-01-13

## 2025-01-13 RX ADMIN — HYDROCORTISONE 1 APPLICATION: 25 CREAM TOPICAL at 21:38

## 2025-01-14 NOTE — ED PROVIDER NOTES
Time reflects when diagnosis was documented in both MDM as applicable and the Disposition within this note       Time User Action Codes Description Comment    1/13/2025  9:23 PM Evelio Dixon Add [L98.9] Psoriasis-like skin disease     1/13/2025  9:23 PM Evelio Dixon Add [R21] Rash and nonspecific skin eruption           ED Disposition       ED Disposition   Discharge    Condition   Stable    Date/Time   Mon Jan 13, 2025  9:35 PM    Comment   Conor Kelley discharge to home/self care.                   Assessment & Plan   {Hyperlinks  Risk Stratification - NIHSS - HEART SCORE - Fill out sepsis note and make sure you call [phone] if severe or septic shock:[id number]}    Medical Decision Making  Risk  Prescription drug management.         Medications   hydrocortisone 2.5 % cream (has no administration in time range)       ED Risk Strat Scores                          SBIRT 22yo+      Flowsheet Row Most Recent Value   Initial Alcohol Screen: US AUDIT-C     1. How often do you have a drink containing alcohol? 0 Filed at: 01/13/2025 2021   2. How many drinks containing alcohol do you have on a typical day you are drinking?  0 Filed at: 01/13/2025 2021   3a. Male UNDER 65: How often do you have five or more drinks on one occasion? 0 Filed at: 01/13/2025 2021   Audit-C Score 0 Filed at: 01/13/2025 2021   [place]: How many times in the past year have you...    Used an illegal drug or used a prescription medication for non-medical reasons? Never Filed at: 01/13/2025 2021                            History of Present Illness   {Hyperlinks  History (Med, Surg, Fam, Social) - Current Medications - Allergies  :[id number]}    Chief Complaint   Patient presents with    Rash     Pt arrived ambulatory with c/o a rash on both hands that started approx a week ago       Past Medical History:   Diagnosis Date    Alcohol abuse 9/7/2016    Anxiety     Bipolar disorder (HCC)     Depression     Psychiatric disorder     Psychiatric illness      "Psychotic disorder (HCC) 3/29/2016    Schizoaffective disorder (HCC)       Past Surgical History:   Procedure Laterality Date    INCISION AND DRAINAGE INTRA ORAL ABSCESS Left 3/3/2023    Procedure: INCISION AND DRAINAGE  (I&D) LEFT LOWER FACIAL ABSCESS;  Surgeon: Herminio Fagan DDS;  Location: [facility];  Service: Maxillofacial    ORIF WRIST FRACTURE Left 9/8/2016    Procedure: OPEN REDUCTION W/ INTERNAL FIXATION (ORIF) RADIUS / ULNA (WRIST);  Surgeon: Cameron Fuentes MD;  Location: [facility];  Service:     TOOTH EXTRACTION  3/3/2023    Procedure: EXTRACTION TOOTH #19;  Surgeon: Herminio Fagan DDS;  Location: [facility];  Service: Maxillofacial      Family History   Problem Relation Age of Onset    No Known Problems Mother     No Known Problems Father     No Known Problems Sister     No Known Problems Brother     No Known Problems Maternal Aunt     No Known Problems Paternal Aunt     No Known Problems Maternal Uncle     No Known Problems Paternal Uncle     No Known Problems Maternal Grandfather     No Known Problems Maternal Grandmother     No Known Problems Paternal Grandfather     No Known Problems Paternal Grandmother     No Known Problems Cousin     ADD / ADHD Neg Hx     Alcohol abuse Neg Hx     Anxiety disorder Neg Hx     Bipolar disorder Neg Hx     Dementia Neg Hx     Depression Neg Hx     Drug abuse Neg Hx     OCD Neg Hx     Paranoid behavior Neg Hx     Schizophrenia Neg Hx     Seizures Neg Hx     Self-Injury Neg Hx     Suicide Attempts Neg Hx       Social History     Tobacco Use    Smoking status: Former    Smokeless tobacco: Never   Substance Use Topics    Alcohol use: Yes     Alcohol/week: 5.0 standard drinks of alcohol     Types: 5 Cans of beer per week     Comment: occasionally    Drug use: Yes     Types: Marijuana     Comment: pt states he has a \"certificate\" from the govt      E-Cigarette/Vaping      E-Cigarette/Vaping Substances      I have reviewed and agree with the history as documented. "     58 y/o male presents to the ED for evaluation of rash over his bilateral hands that started a week ago.        Review of Systems   Constitutional:  Negative for chills and fever.   HENT:  Negative for congestion, rhinorrhea and sore throat.    Respiratory:  Negative for cough and shortness of breath.    Cardiovascular:  Negative for chest pain and palpitations.   Gastrointestinal:  Negative for abdominal pain, diarrhea, nausea and vomiting.   Genitourinary:  Negative for dysuria and hematuria.   Musculoskeletal:  Negative for back pain and neck pain.   Skin:  Positive for rash.   Neurological:  Negative for weakness, light-headedness, numbness and headaches.   All other systems reviewed and are negative.          Objective   {Hyperlinks  Historical Vitals - Historical Labs - Chart Review/Microbiology - Last Echo - Code Status  :[id number]}    ED Triage Vitals [01/13/25 2021]   Temperature Pulse Blood Pressure Respirations SpO2 Patient Position - Orthostatic VS   98.7 °F (37.1 °C) (!) 128 (!) 149/106 20 93 % Sitting      Temp src Heart Rate Source BP Location FiO2 (%) Pain Score    -- Monitor Right arm -- --      Vitals      Date and Time Temp Pulse SpO2 Resp BP Pain Score FACES Pain Rating User   01/13/25 2100 -- 117 -- -- 158/97 -- -- MO   01/13/25 2030 -- 119 92 % -- 147/100 -- -- MO   01/13/25 2021 98.7 °F (37.1 °C) 128 93 % 20 149/106 -- -- MO            Physical Exam      Results Reviewed       None            No orders to display       Procedures    ED Medication and Procedure Management   Prior to Admission Medications   Prescriptions Last Dose Informant Patient Reported? Taking?   OLANZapine (ZyPREXA) 20 MG tablet   Yes No   Sig: Take 20 mg by mouth daily at bedtime   acetaminophen (TYLENOL) 325 mg tablet   No No   Sig: Take 2 tablets (650 mg total) by mouth every 6 (six) hours as needed for mild pain   sertraline (ZOLOFT) 50 mg tablet   No No   Sig: Take 1 tablet (50 mg total) by mouth daily       Facility-Administered Medications: None     Patient's Medications   Discharge Prescriptions    HYDROCORTISONE 2.5 % LOTION    Apply topically 2 (two) times a day for 14 days       Start Date: 1/13/2025 End Date: 1/27/2025       Order Dose: --       Quantity: 50 mL    Refills: 0       ED SEPSIS DOCUMENTATION   Time reflects when diagnosis was documented in both MDM as applicable and the Disposition within this note       Time User Action Codes Description Comment    1/13/2025  9:23 PM Evelio Dixon Add [L98.9] Psoriasis-like skin disease     1/13/2025  9:23 PM Evelio Dixon Add [R21] Rash and nonspecific skin eruption                plaque-like rash over the bilateral and dorsal surfaces.  No open wounds, no active drainage.  See image attached.   Neurological:      General: No focal deficit present.      Mental Status: He is alert and oriented to person, place, and time.           Results Reviewed       None            No orders to display       Procedures    ED Medication and Procedure Management   Prior to Admission Medications   Prescriptions Last Dose Informant Patient Reported? Taking?   OLANZapine (ZyPREXA) 20 MG tablet   Yes No   Sig: Take 20 mg by mouth daily at bedtime   acetaminophen (TYLENOL) 325 mg tablet   No No   Sig: Take 2 tablets (650 mg total) by mouth every 6 (six) hours as needed for mild pain   sertraline (ZOLOFT) 50 mg tablet   No No   Sig: Take 1 tablet (50 mg total) by mouth daily      Facility-Administered Medications: None     Patient's Medications   Discharge Prescriptions    HYDROCORTISONE 2.5 % LOTION    Apply topically 2 (two) times a day for 14 days       Start Date: 1/13/2025 End Date: 1/27/2025       Order Dose: --       Quantity: 50 mL    Refills: 0       ED SEPSIS DOCUMENTATION   Time reflects when diagnosis was documented in both MDM as applicable and the Disposition within this note       Time User Action Codes Description Comment    1/13/2025  9:23 PM Evelio Dixon [L98.9] Psoriasis-like skin disease     1/13/2025  9:23 PM Evelio Dixon [R21] Rash and nonspecific skin eruption                  Evelio Dixon MD  01/27/25 0904

## 2025-01-17 ENCOUNTER — TELEPHONE (OUTPATIENT)
Age: 60
End: 2025-01-17

## 2025-01-17 NOTE — TELEPHONE ENCOUNTER
Please review and advise if okay to add to AP schedule in parallel or offer next available Derm Res?

## 2025-01-17 NOTE — TELEPHONE ENCOUNTER
Attempted to reach pt to offer him appt, 1st contact number does not ring, I left a VM. Second number on chart does not dial at all.

## 2025-01-17 NOTE — TELEPHONE ENCOUNTER
"Pt calling with routine referral to derm from ED for bilateral rash on hands    Pt was seen 1/13 at ED for a \"psoriasis-like skin disease\" on hands (see clinical image)    Advised pt I will need to review his chart with mgt/providers and call him back to schedule appropriately, pt agreed    Please advise, I can call pt back at [phone]  "

## 2025-01-21 NOTE — TELEPHONE ENCOUNTER
Called and spoke to patient. Scheduled consultation with Manohar & Dr. Saavedra on 1/30/2025 @ 11:20 am at [facility].    Patient aware of office location.  Patient aware to arrive 15 minutes prior.  Confirmed [employer] health coverage.    Updated and attached referral from ED.

## (undated) DEVICE — HEMOSTATIC MATRIX SURGIFLO 8ML W/THROMBIN

## (undated) DEVICE — SYRINGE 20ML LL

## (undated) DEVICE — INTENDED FOR TISSUE SEPARATION, AND OTHER PROCEDURES THAT REQUIRE A SHARP SURGICAL BLADE TO PUNCTURE OR CUT.: Brand: BARD-PARKER ® CARBON RIB-BACK BLADES

## (undated) DEVICE — SYRINGE 10ML LL

## (undated) DEVICE — LIGHT GLOVE GREEN

## (undated) DEVICE — SURGIFOAM 7 X 12 SPONGE ABS

## (undated) DEVICE — NEEDLE 25G X 1 1/2

## (undated) DEVICE — GLOVE SRG BIOGEL 7

## (undated) DEVICE — GLOVE INDICATOR PI UNDERGLOVE SZ 7 BLUE

## (undated) DEVICE — SPONGE STICK WITH PVP-I: Brand: KENDALL

## (undated) DEVICE — ASTOUND STANDARD SURGICAL GOWN, XL: Brand: CONVERTORS

## (undated) DEVICE — PACK PBDS MANDIBLE RF

## (undated) DEVICE — DECANTER: Brand: UNBRANDED

## (undated) DEVICE — DISPOSABLE OR TOWEL: Brand: CARDINAL HEALTH

## (undated) DEVICE — IV CATH 18 G X 1.16 IN

## (undated) DEVICE — MAGNETIC INSTRUMENT PAD 16" X 20"; LARGE; DISPOSABLE: Brand: CARDINAL HEALTH